# Patient Record
Sex: FEMALE | Race: WHITE | NOT HISPANIC OR LATINO
[De-identification: names, ages, dates, MRNs, and addresses within clinical notes are randomized per-mention and may not be internally consistent; named-entity substitution may affect disease eponyms.]

---

## 2015-07-19 RX ORDER — ASPIRIN/CALCIUM CARB/MAGNESIUM 324 MG
1 TABLET ORAL
Qty: 0 | Refills: 0 | DISCHARGE
Start: 2015-07-19

## 2015-07-19 RX ORDER — ESCITALOPRAM OXALATE 10 MG/1
1 TABLET, FILM COATED ORAL
Qty: 0 | Refills: 0 | DISCHARGE
Start: 2015-07-19

## 2017-02-03 ENCOUNTER — APPOINTMENT (OUTPATIENT)
Dept: SURGERY | Facility: CLINIC | Age: 52
End: 2017-02-03

## 2017-02-03 VITALS — HEIGHT: 63 IN | BODY MASS INDEX: 31.36 KG/M2 | WEIGHT: 177 LBS

## 2017-02-13 ENCOUNTER — FORM ENCOUNTER (OUTPATIENT)
Age: 52
End: 2017-02-13

## 2017-02-14 ENCOUNTER — OUTPATIENT (OUTPATIENT)
Dept: OUTPATIENT SERVICES | Facility: HOSPITAL | Age: 52
LOS: 1 days | End: 2017-02-14
Payer: COMMERCIAL

## 2017-02-14 DIAGNOSIS — Z41.1 ENCOUNTER FOR COSMETIC SURGERY: Chronic | ICD-10-CM

## 2017-02-14 DIAGNOSIS — R59.1 GENERALIZED ENLARGED LYMPH NODES: Chronic | ICD-10-CM

## 2017-02-14 DIAGNOSIS — Z98.89 OTHER SPECIFIED POSTPROCEDURAL STATES: Chronic | ICD-10-CM

## 2017-02-14 DIAGNOSIS — Z90.711 ACQUIRED ABSENCE OF UTERUS WITH REMAINING CERVICAL STUMP: Chronic | ICD-10-CM

## 2017-02-14 DIAGNOSIS — Q17.9 CONGENITAL MALFORMATION OF EAR, UNSPECIFIED: Chronic | ICD-10-CM

## 2017-02-14 DIAGNOSIS — Z90.13 ACQUIRED ABSENCE OF BILATERAL BREASTS AND NIPPLES: Chronic | ICD-10-CM

## 2017-02-14 DIAGNOSIS — K22.2 ESOPHAGEAL OBSTRUCTION: ICD-10-CM

## 2017-02-14 PROCEDURE — 74220 X-RAY XM ESOPHAGUS 1CNTRST: CPT | Mod: 26

## 2017-02-14 PROCEDURE — 74220 X-RAY XM ESOPHAGUS 1CNTRST: CPT

## 2017-03-01 ENCOUNTER — APPOINTMENT (OUTPATIENT)
Dept: THORACIC SURGERY | Facility: CLINIC | Age: 52
End: 2017-03-01

## 2017-03-01 VITALS
OXYGEN SATURATION: 98 % | BODY MASS INDEX: 32.25 KG/M2 | RESPIRATION RATE: 16 BRPM | WEIGHT: 182 LBS | SYSTOLIC BLOOD PRESSURE: 118 MMHG | HEART RATE: 54 BPM | HEIGHT: 63 IN | DIASTOLIC BLOOD PRESSURE: 76 MMHG

## 2017-03-29 RX ORDER — METHOCARBAMOL 500 MG/1
TABLET, FILM COATED ORAL
Refills: 0 | Status: DISCONTINUED | COMMUNITY
End: 2017-03-29

## 2017-05-31 ENCOUNTER — OUTPATIENT (OUTPATIENT)
Dept: OUTPATIENT SERVICES | Facility: HOSPITAL | Age: 52
LOS: 1 days | End: 2017-05-31
Payer: COMMERCIAL

## 2017-05-31 VITALS
WEIGHT: 173.94 LBS | HEIGHT: 63.5 IN | HEART RATE: 55 BPM | RESPIRATION RATE: 14 BRPM | TEMPERATURE: 98 F | OXYGEN SATURATION: 97 % | SYSTOLIC BLOOD PRESSURE: 124 MMHG | DIASTOLIC BLOOD PRESSURE: 84 MMHG

## 2017-05-31 DIAGNOSIS — Z98.89 OTHER SPECIFIED POSTPROCEDURAL STATES: Chronic | ICD-10-CM

## 2017-05-31 DIAGNOSIS — Z01.818 ENCOUNTER FOR OTHER PREPROCEDURAL EXAMINATION: ICD-10-CM

## 2017-05-31 DIAGNOSIS — K21.9 GASTRO-ESOPHAGEAL REFLUX DISEASE WITHOUT ESOPHAGITIS: ICD-10-CM

## 2017-05-31 DIAGNOSIS — Z90.711 ACQUIRED ABSENCE OF UTERUS WITH REMAINING CERVICAL STUMP: Chronic | ICD-10-CM

## 2017-05-31 DIAGNOSIS — G47.33 OBSTRUCTIVE SLEEP APNEA (ADULT) (PEDIATRIC): ICD-10-CM

## 2017-05-31 DIAGNOSIS — Z98.890 OTHER SPECIFIED POSTPROCEDURAL STATES: Chronic | ICD-10-CM

## 2017-05-31 DIAGNOSIS — Z90.13 ACQUIRED ABSENCE OF BILATERAL BREASTS AND NIPPLES: Chronic | ICD-10-CM

## 2017-05-31 DIAGNOSIS — Z41.1 ENCOUNTER FOR COSMETIC SURGERY: Chronic | ICD-10-CM

## 2017-05-31 DIAGNOSIS — R59.1 GENERALIZED ENLARGED LYMPH NODES: Chronic | ICD-10-CM

## 2017-05-31 DIAGNOSIS — M25.472 EFFUSION, LEFT ANKLE: ICD-10-CM

## 2017-05-31 DIAGNOSIS — Q17.9 CONGENITAL MALFORMATION OF EAR, UNSPECIFIED: Chronic | ICD-10-CM

## 2017-05-31 LAB
ANION GAP SERPL CALC-SCNC: 18 MMOL/L — HIGH (ref 5–17)
BUN SERPL-MCNC: 13 MG/DL — SIGNIFICANT CHANGE UP (ref 7–23)
CALCIUM SERPL-MCNC: 10.2 MG/DL — SIGNIFICANT CHANGE UP (ref 8.4–10.5)
CHLORIDE SERPL-SCNC: 101 MMOL/L — SIGNIFICANT CHANGE UP (ref 96–108)
CO2 SERPL-SCNC: 22 MMOL/L — SIGNIFICANT CHANGE UP (ref 22–31)
CREAT SERPL-MCNC: 0.83 MG/DL — SIGNIFICANT CHANGE UP (ref 0.5–1.3)
GLUCOSE SERPL-MCNC: 116 MG/DL — HIGH (ref 70–99)
HCT VFR BLD CALC: 41.3 % — SIGNIFICANT CHANGE UP (ref 34.5–45)
HGB BLD-MCNC: 13.9 G/DL — SIGNIFICANT CHANGE UP (ref 11.5–15.5)
MCHC RBC-ENTMCNC: 30.5 PG — SIGNIFICANT CHANGE UP (ref 27–34)
MCHC RBC-ENTMCNC: 33.7 GM/DL — SIGNIFICANT CHANGE UP (ref 32–36)
MCV RBC AUTO: 90.8 FL — SIGNIFICANT CHANGE UP (ref 80–100)
PLATELET # BLD AUTO: 187 K/UL — SIGNIFICANT CHANGE UP (ref 150–400)
POTASSIUM SERPL-MCNC: 4.2 MMOL/L — SIGNIFICANT CHANGE UP (ref 3.5–5.3)
POTASSIUM SERPL-SCNC: 4.2 MMOL/L — SIGNIFICANT CHANGE UP (ref 3.5–5.3)
RBC # BLD: 4.55 M/UL — SIGNIFICANT CHANGE UP (ref 3.8–5.2)
RBC # FLD: 13.9 % — SIGNIFICANT CHANGE UP (ref 10.3–14.5)
SODIUM SERPL-SCNC: 141 MMOL/L — SIGNIFICANT CHANGE UP (ref 135–145)
WBC # BLD: 8.48 K/UL — SIGNIFICANT CHANGE UP (ref 3.8–10.5)
WBC # FLD AUTO: 8.48 K/UL — SIGNIFICANT CHANGE UP (ref 3.8–10.5)

## 2017-05-31 PROCEDURE — 85027 COMPLETE CBC AUTOMATED: CPT

## 2017-05-31 PROCEDURE — G0463: CPT

## 2017-05-31 PROCEDURE — 80048 BASIC METABOLIC PNL TOTAL CA: CPT

## 2017-05-31 NOTE — H&P PST ADULT - PMH
Adjustment disorder with depressed mood    Aortic aneurysm  diagnosed 2013  Bradycardia  normally in the 40's  Breast CA    Contact dermatitis  chronic hands  Decreased hearing, left  uses hearing aid 80 % loss  Factor V Leiden carrier  one copy of factor V deficiency carrier  on hematology follow up every 6 months  HTN (hypertension)    Incisional hernia    Morbid obesity  on c-pap  gastric vertical sleeve done 07/14.  wt loss 100 lbs  MSSA (methicillin susceptible Staphylococcus aureus) infection  4/15 surgical inicsion , s/p hernia repair  FACUNDO (obstructive sleep apnea)    PE (pulmonary embolism)  09/2013  managed with Xarolta, now on asprin 81  IVC green field filter placed 07/2014  Pulmonary embolism  9/2013

## 2017-05-31 NOTE — H&P PST ADULT - ACTIVITY
Ice hockey 2x/week, Walking: 3 times a week for 1/2 hour, Kayaking in the summer 2 times a week; Ice Hockey in the winter twice a week

## 2017-05-31 NOTE — H&P PST ADULT - HISTORY OF PRESENT ILLNESS
49 year old female reports a known history of aortic aneurysm since 2013. Recent imaging revealed aneurysm had increased in size, presents for Ascending Aortic Replacement. 50 yo female s/p gastric sleeve, S/P AAA, now for hernia.

## 2017-05-31 NOTE — H&P PST ADULT - FAMILY HISTORY
Mother  Still living? Unknown  Family history of breast cancer, Age at diagnosis: Age Unknown     Sibling  Still living? Unknown  Family history of breast cancer, Age at diagnosis: Age Unknown     Aunt  Still living? Unknown  Family history of breast cancer, Age at diagnosis: Age Unknown

## 2017-05-31 NOTE — H&P PST ADULT - PSH
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  H/O ventral hernia repair  4/15  LN (lymphadenopathy)  2 AXILLARY ln REMOVED Right   -BENIGN 2007  Presence of IVC filter  VIA RIGHT GROIN, 2014  with GASTRIC SLEEVE  S/P AAA (abdominal aortic aneurysm) repair  2015  S/P biopsy  1991, right breast, 2007 axillary right arm pit (benign)  S/P biopsy  2011 left breast (negative)  S/P foot surgery  2013 rigth foot, fracture, hammer toe and bunionectomy  S/P mastectomy, bilateral  LCIS,  DOUBLE MASTECTOMY   with DEIP procedure 2012 (right breast CA)  S/p partial hysterectomy with remaining cervical stump  for fibroids 2008  S/P rhinoplasty  1982  S/P tonsillectomy  1979

## 2017-06-07 ENCOUNTER — APPOINTMENT (OUTPATIENT)
Dept: CARDIOLOGY | Facility: CLINIC | Age: 52
End: 2017-06-07

## 2017-06-08 ENCOUNTER — APPOINTMENT (OUTPATIENT)
Dept: CARDIOLOGY | Facility: CLINIC | Age: 52
End: 2017-06-08

## 2017-06-14 ENCOUNTER — INPATIENT (INPATIENT)
Facility: HOSPITAL | Age: 52
LOS: 0 days | Discharge: HOME | DRG: 327 | End: 2017-06-15
Attending: SURGERY | Admitting: SURGERY
Payer: COMMERCIAL

## 2017-06-14 ENCOUNTER — APPOINTMENT (OUTPATIENT)
Dept: SURGERY | Facility: HOSPITAL | Age: 52
End: 2017-06-14

## 2017-06-14 ENCOUNTER — TRANSCRIPTION ENCOUNTER (OUTPATIENT)
Age: 52
End: 2017-06-14

## 2017-06-14 VITALS
HEIGHT: 63 IN | SYSTOLIC BLOOD PRESSURE: 141 MMHG | HEART RATE: 48 BPM | TEMPERATURE: 99 F | DIASTOLIC BLOOD PRESSURE: 88 MMHG | OXYGEN SATURATION: 98 % | RESPIRATION RATE: 18 BRPM | WEIGHT: 169.09 LBS

## 2017-06-14 DIAGNOSIS — Z98.89 OTHER SPECIFIED POSTPROCEDURAL STATES: Chronic | ICD-10-CM

## 2017-06-14 DIAGNOSIS — Z01.818 ENCOUNTER FOR OTHER PREPROCEDURAL EXAMINATION: ICD-10-CM

## 2017-06-14 DIAGNOSIS — Z90.13 ACQUIRED ABSENCE OF BILATERAL BREASTS AND NIPPLES: Chronic | ICD-10-CM

## 2017-06-14 DIAGNOSIS — R59.1 GENERALIZED ENLARGED LYMPH NODES: Chronic | ICD-10-CM

## 2017-06-14 DIAGNOSIS — K21.9 GASTRO-ESOPHAGEAL REFLUX DISEASE WITHOUT ESOPHAGITIS: ICD-10-CM

## 2017-06-14 DIAGNOSIS — Z90.711 ACQUIRED ABSENCE OF UTERUS WITH REMAINING CERVICAL STUMP: Chronic | ICD-10-CM

## 2017-06-14 DIAGNOSIS — Q17.9 CONGENITAL MALFORMATION OF EAR, UNSPECIFIED: Chronic | ICD-10-CM

## 2017-06-14 DIAGNOSIS — Z41.1 ENCOUNTER FOR COSMETIC SURGERY: Chronic | ICD-10-CM

## 2017-06-14 DIAGNOSIS — Z98.890 OTHER SPECIFIED POSTPROCEDURAL STATES: Chronic | ICD-10-CM

## 2017-06-14 RX ORDER — CEFAZOLIN SODIUM 1 G
2000 VIAL (EA) INJECTION ONCE
Qty: 0 | Refills: 0 | Status: DISCONTINUED | OUTPATIENT
Start: 2017-06-14 | End: 2017-06-14

## 2017-06-14 RX ORDER — ACETAMINOPHEN 500 MG
1000 TABLET ORAL ONCE
Qty: 0 | Refills: 0 | Status: COMPLETED | OUTPATIENT
Start: 2017-06-14 | End: 2017-06-14

## 2017-06-14 RX ORDER — DEXTROSE MONOHYDRATE, SODIUM CHLORIDE, AND POTASSIUM CHLORIDE 50; .745; 4.5 G/1000ML; G/1000ML; G/1000ML
1000 INJECTION, SOLUTION INTRAVENOUS
Qty: 0 | Refills: 0 | Status: DISCONTINUED | OUTPATIENT
Start: 2017-06-14 | End: 2017-06-15

## 2017-06-14 RX ORDER — HYDROMORPHONE HYDROCHLORIDE 2 MG/ML
0.5 INJECTION INTRAMUSCULAR; INTRAVENOUS; SUBCUTANEOUS
Qty: 0 | Refills: 0 | Status: DISCONTINUED | OUTPATIENT
Start: 2017-06-14 | End: 2017-06-14

## 2017-06-14 RX ORDER — PANTOPRAZOLE SODIUM 20 MG/1
40 TABLET, DELAYED RELEASE ORAL
Qty: 0 | Refills: 0 | Status: DISCONTINUED | OUTPATIENT
Start: 2017-06-14 | End: 2017-06-15

## 2017-06-14 RX ORDER — ONDANSETRON 8 MG/1
4 TABLET, FILM COATED ORAL ONCE
Qty: 0 | Refills: 0 | Status: DISCONTINUED | OUTPATIENT
Start: 2017-06-14 | End: 2017-06-14

## 2017-06-14 RX ORDER — IBUPROFEN 200 MG
600 TABLET ORAL EVERY 6 HOURS
Qty: 0 | Refills: 0 | Status: DISCONTINUED | OUTPATIENT
Start: 2017-06-14 | End: 2017-06-15

## 2017-06-14 RX ORDER — ESCITALOPRAM OXALATE 10 MG/1
10 TABLET, FILM COATED ORAL DAILY
Qty: 0 | Refills: 0 | Status: DISCONTINUED | OUTPATIENT
Start: 2017-06-14 | End: 2017-06-15

## 2017-06-14 RX ORDER — HEPARIN SODIUM 5000 [USP'U]/ML
5000 INJECTION INTRAVENOUS; SUBCUTANEOUS EVERY 8 HOURS
Qty: 0 | Refills: 0 | Status: DISCONTINUED | OUTPATIENT
Start: 2017-06-14 | End: 2017-06-15

## 2017-06-14 RX ORDER — ACETAMINOPHEN 500 MG
650 TABLET ORAL EVERY 6 HOURS
Qty: 0 | Refills: 0 | Status: DISCONTINUED | OUTPATIENT
Start: 2017-06-14 | End: 2017-06-15

## 2017-06-14 RX ORDER — HYDRALAZINE HCL 50 MG
10 TABLET ORAL ONCE
Qty: 0 | Refills: 0 | Status: COMPLETED | OUTPATIENT
Start: 2017-06-14 | End: 2017-06-14

## 2017-06-14 RX ORDER — SODIUM CHLORIDE 9 MG/ML
3 INJECTION INTRAMUSCULAR; INTRAVENOUS; SUBCUTANEOUS EVERY 8 HOURS
Qty: 0 | Refills: 0 | Status: DISCONTINUED | OUTPATIENT
Start: 2017-06-14 | End: 2017-06-14

## 2017-06-14 RX ADMIN — Medication 650 MILLIGRAM(S): at 16:18

## 2017-06-14 RX ADMIN — HEPARIN SODIUM 5000 UNIT(S): 5000 INJECTION INTRAVENOUS; SUBCUTANEOUS at 14:25

## 2017-06-14 RX ADMIN — Medication 650 MILLIGRAM(S): at 16:48

## 2017-06-14 RX ADMIN — Medication 400 MILLIGRAM(S): at 22:50

## 2017-06-14 RX ADMIN — HEPARIN SODIUM 5000 UNIT(S): 5000 INJECTION INTRAVENOUS; SUBCUTANEOUS at 21:25

## 2017-06-14 RX ADMIN — Medication 1000 MILLIGRAM(S): at 23:20

## 2017-06-14 RX ADMIN — Medication 10 MILLIGRAM(S): at 18:31

## 2017-06-14 NOTE — PATIENT PROFILE ADULT. - PMH
AAA (abdominal aortic aneurysm)  4.8 cm (as per pt.), being f/u by  Dr William Jorgensen  on standby if any troble during surgery, (last visit 04/01/15) &advised/ planning to do surgery 07/2015) cardiologist Dr. Arzate  Bradycardia  normally in the 40's  Breast CA    Decreased hearing, left  uses hearing aid 80 % loss  Factor V Leiden carrier  one copy of factor V deficiency carrier  on hematology follow up every 6 months  HTN (hypertension)    Incisional hernia    Morbid obesity  on c-pap  gastric vertical sleeve done 07/14.  FACUNDO (obstructive sleep apnea)    PE (pulmonary embolism)  09/2013  managed with Xarolta, now on asprin 81  IVC green field filter placed 07/2014  Pulmonary embolism  9/2013

## 2017-06-14 NOTE — PATIENT PROFILE ADULT. - FAMILY HISTORY
Sibling  Still living? Unknown  Family history of breast cancer, Age at diagnosis: Age Unknown     Aunt  Still living? Unknown  Family history of breast cancer, Age at diagnosis: Age Unknown

## 2017-06-14 NOTE — PROVIDER CONTACT NOTE (OTHER) - ASSESSMENT
pt stated " My neck is swollen probably from the tube that was in my throat during surgery and I can't swallow that good" RN palpated neck and it felt swollen. pt with pain as well but cannot swallow pain meds and cannot put on bipap machine too

## 2017-06-14 NOTE — PROVIDER CONTACT NOTE (OTHER) - ACTION/TREATMENT ORDERED:
MD assessed pt and stated to put nasal cannula 2 liters on tonight to sleep and MD ordered iv Tylenol for the pain. will monitor.

## 2017-06-14 NOTE — PATIENT PROFILE ADULT. - ABILITY TO HEAR (WITH HEARING AID OR HEARING APPLIANCE IF NORMALLY USED):
hearing aides at home/Mildly to Moderately Impaired: difficulty hearing in some environments or speaker may need to increase volume or speak distinctly

## 2017-06-14 NOTE — PATIENT PROFILE ADULT. - PSH
Ear anomaly  s/p mastoidectomy, left ear 1982, prosthesis on left ear 1982 - did not work - uses hearing aid  LN (lymphadenopathy)  2 AXILLARY ln REMOVED Right   -BENIGN 2007  Presence of IVC filter  VIA RIGHT GROIN, 2014  with GASTRIC SLEEVE  S/P biopsy  1991, right breast, 2007 axillary right arm pit (benign)  S/P biopsy  2011 left breast (negative)  S/P foot surgery  2013 rigth foot, fracture, hammer toe and bunionectomy  S/P mastectomy, bilateral  LCIS,  DOUBLE MASTECTOMY   with DEIP procedure 2012 (right breast CA)  S/p partial hysterectomy with remaining cervical stump  for fibroids 2008  S/P rhinoplasty  1982  S/P tonsillectomy  1979

## 2017-06-14 NOTE — PROVIDER CONTACT NOTE (OTHER) - ACTION/TREATMENT ORDERED:
provider aware, states she will consult with resident. Hydralazine 10mg ordered with BP after of 159/88 HR 79, will continue to monitor

## 2017-06-15 VITALS
RESPIRATION RATE: 18 BRPM | DIASTOLIC BLOOD PRESSURE: 85 MMHG | HEART RATE: 65 BPM | SYSTOLIC BLOOD PRESSURE: 162 MMHG | OXYGEN SATURATION: 96 % | TEMPERATURE: 98 F

## 2017-06-15 LAB
ANION GAP SERPL CALC-SCNC: 15 MMOL/L — SIGNIFICANT CHANGE UP (ref 5–17)
BUN SERPL-MCNC: 9 MG/DL — SIGNIFICANT CHANGE UP (ref 7–23)
CALCIUM SERPL-MCNC: 9 MG/DL — SIGNIFICANT CHANGE UP (ref 8.4–10.5)
CHLORIDE SERPL-SCNC: 102 MMOL/L — SIGNIFICANT CHANGE UP (ref 96–108)
CO2 SERPL-SCNC: 22 MMOL/L — SIGNIFICANT CHANGE UP (ref 22–31)
CREAT SERPL-MCNC: 0.6 MG/DL — SIGNIFICANT CHANGE UP (ref 0.5–1.3)
GLUCOSE SERPL-MCNC: 102 MG/DL — HIGH (ref 70–99)
HCT VFR BLD CALC: 39 % — SIGNIFICANT CHANGE UP (ref 34.5–45)
HGB BLD-MCNC: 12.9 G/DL — SIGNIFICANT CHANGE UP (ref 11.5–15.5)
MAGNESIUM SERPL-MCNC: 2.1 MG/DL — SIGNIFICANT CHANGE UP (ref 1.6–2.6)
MCHC RBC-ENTMCNC: 30.1 PG — SIGNIFICANT CHANGE UP (ref 27–34)
MCHC RBC-ENTMCNC: 33.1 GM/DL — SIGNIFICANT CHANGE UP (ref 32–36)
MCV RBC AUTO: 90.9 FL — SIGNIFICANT CHANGE UP (ref 80–100)
PHOSPHATE SERPL-MCNC: 3.3 MG/DL — SIGNIFICANT CHANGE UP (ref 2.5–4.5)
PLATELET # BLD AUTO: 185 K/UL — SIGNIFICANT CHANGE UP (ref 150–400)
POTASSIUM SERPL-MCNC: 3.6 MMOL/L — SIGNIFICANT CHANGE UP (ref 3.5–5.3)
POTASSIUM SERPL-SCNC: 3.6 MMOL/L — SIGNIFICANT CHANGE UP (ref 3.5–5.3)
RBC # BLD: 4.29 M/UL — SIGNIFICANT CHANGE UP (ref 3.8–5.2)
RBC # FLD: 14.3 % — SIGNIFICANT CHANGE UP (ref 10.3–14.5)
SODIUM SERPL-SCNC: 139 MMOL/L — SIGNIFICANT CHANGE UP (ref 135–145)
WBC # BLD: 9.58 K/UL — SIGNIFICANT CHANGE UP (ref 3.8–10.5)
WBC # FLD AUTO: 9.58 K/UL — SIGNIFICANT CHANGE UP (ref 3.8–10.5)

## 2017-06-15 PROCEDURE — 84100 ASSAY OF PHOSPHORUS: CPT

## 2017-06-15 PROCEDURE — 80048 BASIC METABOLIC PNL TOTAL CA: CPT

## 2017-06-15 PROCEDURE — 83735 ASSAY OF MAGNESIUM: CPT

## 2017-06-15 PROCEDURE — C1889: CPT

## 2017-06-15 PROCEDURE — 85027 COMPLETE CBC AUTOMATED: CPT

## 2017-06-15 RX ORDER — IBUPROFEN 200 MG
1 TABLET ORAL
Qty: 0 | Refills: 0 | DISCHARGE
Start: 2017-06-15

## 2017-06-15 RX ORDER — ACETAMINOPHEN 500 MG
2 TABLET ORAL
Qty: 0 | Refills: 0 | DISCHARGE
Start: 2017-06-15

## 2017-06-15 RX ADMIN — DEXTROSE MONOHYDRATE, SODIUM CHLORIDE, AND POTASSIUM CHLORIDE 50 MILLILITER(S): 50; .745; 4.5 INJECTION, SOLUTION INTRAVENOUS at 05:30

## 2017-06-15 RX ADMIN — Medication 650 MILLIGRAM(S): at 11:27

## 2017-06-15 RX ADMIN — HEPARIN SODIUM 5000 UNIT(S): 5000 INJECTION INTRAVENOUS; SUBCUTANEOUS at 05:32

## 2017-06-15 RX ADMIN — Medication 650 MILLIGRAM(S): at 11:57

## 2017-06-15 RX ADMIN — ESCITALOPRAM OXALATE 10 MILLIGRAM(S): 10 TABLET, FILM COATED ORAL at 11:27

## 2017-06-15 RX ADMIN — Medication 650 MILLIGRAM(S): at 05:31

## 2017-06-15 RX ADMIN — Medication 650 MILLIGRAM(S): at 06:00

## 2017-06-15 RX ADMIN — PANTOPRAZOLE SODIUM 40 MILLIGRAM(S): 20 TABLET, DELAYED RELEASE ORAL at 05:32

## 2017-06-15 RX ADMIN — HEPARIN SODIUM 5000 UNIT(S): 5000 INJECTION INTRAVENOUS; SUBCUTANEOUS at 13:36

## 2017-06-15 NOTE — DISCHARGE NOTE ADULT - PLAN OF CARE
Postoperative Recovery FOLLOW-UP:  1. Please call to make a follow-up appointment within one week of discharge with Dr. Douglas (See below for office information to call for an appointment)   2. Please follow up with your primary care physician in one week regarding your hospitalization.  ACTIVITY: No heavy lifting or straining. Otherwise, you may return to your usual level of physical activity. If you are taking narcotic pain medication (such as Percocet), do NOT drive a car, operate machinery or make important decisions.  DIET: Please continue a soft diet until instructed by Dr. Douglas to change your diet.  WOUND CARE: Do not remove steri strips. They will fall off on their own.  After showering, please pat steri strips dry. After surgery, some blood may escape from under the tape. The paper tape may fall off after several days. If not, they will be removed in the office.  BATHING: Please do not submerge wound underwater. You may shower and/or sponge bathe.  NOTIFY YOUR SURGEON IF: You have any bleeding that does not stop, any pus draining from your wound, any fever (over 100.4 F) or chills, persistent nausea/vomiting, persistent diarrhea, or if your pain is not controlled on your discharge pain medications.

## 2017-06-15 NOTE — DISCHARGE NOTE ADULT - CARE PROVIDER_API CALL
Jose Douglas (MD), Surgery  [address] [place] [address]  Phone: [phone]  Fax: [phone]

## 2017-06-15 NOTE — PROGRESS NOTE ADULT - ATTENDING COMMENTS
Pt seen and examined  Agree with note which was reviewed and edited where appropriate.  D/W patient, RN, residents and Fellow

## 2017-06-15 NOTE — DISCHARGE NOTE ADULT - PATIENT PORTAL LINK FT
“You can access the Follow[facility] Patient Portal, offered by [facility], by registering with the following website: [email or web address]”

## 2017-06-15 NOTE — DISCHARGE NOTE ADULT - ADDITIONAL INSTRUCTIONS
Please follow up with Dr. Douglas within 1-2 weeks after discharge from the hospital. You may call [phone] to schedule an appointment.

## 2017-06-15 NOTE — DISCHARGE NOTE ADULT - HOSPITAL COURSE
51yF was admitted to [facility] on 6/14. The patient had a hiatal hernia. She underwent a laparoscopic repair of hiatal hernia w/ gastropexy. Post operative the patient was sent to the PACU, the patient was hemodynamically stable and sent to a surgical floor. She was pain was controlled by IV pain medications transitioned to po narcotics. The patient was started on a CLD, advanced to a soft diet, and tolerated it well. The patient was hemodynamically stable and placed on home medications. She was told to follow up with Dr. Douglas in 1-2 weeks and had no other issues. Patient remained hemodynamically stable and was cleared per attending for discharge; tolerating soft diet, voiding appropriately, ambulating, and with pain well controlled on oral analgesics.

## 2017-06-15 NOTE — DISCHARGE NOTE ADULT - MEDICATION SUMMARY - MEDICATIONS TO TAKE
I will START or STAY ON the medications listed below when I get home from the hospital:    acetaminophen 325 mg oral tablet  -- 2 tab(s) by mouth every 6 hours, As needed, Moderate Pain (4 - 6)  -- Indication: For Pain    ibuprofen 600 mg oral tablet  -- 1 tab(s) by mouth every 6 hours, As needed, Severe Pain  -- Indication: For Pain    aspirin 81 mg oral tablet  -- 1 tab(s) by mouth once a day  -- Indication: For Factor V Liden    escitalopram 10 mg oral tablet  -- 1 tab(s) by mouth once a day  -- Indication: For Depression    Flonase Sensimist 27.5 mcg/inh nasal spray  -- 2 spray(s) into nose once a day  -- Indication: For Allergic Rhinitis    pantoprazole 20 mg oral delayed release tablet  -- 1 tab(s) by mouth once a day  -- Indication: For GERD    Multi-Day Plus Minerals oral tablet  -- 1 tab(s) by mouth once a day  -- Indication: For Nutrition

## 2017-06-15 NOTE — PROGRESS NOTE ADULT - SUBJECTIVE AND OBJECTIVE BOX
GENERAL SURGERY DAILY PROGRESS NOTE:     Hospital Day: 2    Postoperative Day: 1    Status post: Lap hiatal hernia repair    Subjective:  Pain well controlled. Passing flatus Denies BM. Tolerating CLD. Denies N/V. No CP, SoB, palpitations.          Objective:    PE:  General: NAD  Resp: airway patent, respirations unlabored, no increased WoB  CVS: RRR  Abdomen: soft, ND, NT, incisions c/d/i  Extremities: no edema  Neuro: AAOx3, no focal deficits    Vital Signs Last 24 Hrs  T(C): 36.6, Max: 37 (06-14 @ 06:22)  T(F): 97.8, Max: 98.6 (06-14 @ 06:22)  HR: 58 (48 - 80)  BP: 160/87 (117/65 - 173/97)  BP(mean): 110 (86 - 119)  RR: 18 (13 - 18)  SpO2: 96% (93% - 100%)    I&O's Detail    I & Os for current day (as of 15 Nickolas 2017 05:26)  =============================================  IN:    dextrose 5% + sodium chloride 0.45% with potassium chloride 20 mEq/L: 220 ml    Oral Fluid: 120 ml    IV PiggyBack: 100 ml    Total IN: 440 ml  ---------------------------------------------  OUT:    Voided: 2450 ml    Indwelling Catheter - Urethral: 200 ml    Total OUT: 2650 ml  ---------------------------------------------  Total NET: -2210 ml      Daily Height in cm: 160.02 (14 Jun 2017 06:22)    Daily     MEDICATIONS  (STANDING):  heparin  Injectable 5000Unit(s) SubCutaneous every 8 hours  pantoprazole    Tablet 40milliGRAM(s) Oral before breakfast  escitalopram 10milliGRAM(s) Oral daily  dextrose 5% + sodium chloride 0.45% with potassium chloride 20 mEq/L 1000milliLiter(s) IV Continuous <Continuous>    MEDICATIONS  (PRN):  acetaminophen   Tablet. 650milliGRAM(s) Oral every 6 hours PRN Moderate Pain (4 - 6)  ibuprofen  Tablet 600milliGRAM(s) Oral every 6 hours PRN Severe Pain      LABS:                RADIOLOGY & ADDITIONAL STUDIES:

## 2017-06-15 NOTE — DISCHARGE NOTE ADULT - CARE PLAN
Principal Discharge DX:	Hiatal hernia  Goal:	Postoperative Recovery  Instructions for follow-up, activity and diet:	FOLLOW-UP:  1. Please call to make a follow-up appointment within one week of discharge with Dr. Douglas (See below for office information to call for an appointment)   2. Please follow up with your primary care physician in one week regarding your hospitalization.  ACTIVITY: No heavy lifting or straining. Otherwise, you may return to your usual level of physical activity. If you are taking narcotic pain medication (such as Percocet), do NOT drive a car, operate machinery or make important decisions.  DIET: Please continue a soft diet until instructed by Dr. Douglas to change your diet.  WOUND CARE: Do not remove steri strips. They will fall off on their own.  After showering, please pat steri strips dry. After surgery, some blood may escape from under the tape. The paper tape may fall off after several days. If not, they will be removed in the office.  BATHING: Please do not submerge wound underwater. You may shower and/or sponge bathe.  NOTIFY YOUR SURGEON IF: You have any bleeding that does not stop, any pus draining from your wound, any fever (over 100.4 F) or chills, persistent nausea/vomiting, persistent diarrhea, or if your pain is not controlled on your discharge pain medications.

## 2017-06-16 ENCOUNTER — TRANSCRIPTION ENCOUNTER (OUTPATIENT)
Age: 52
End: 2017-06-16

## 2017-06-23 ENCOUNTER — APPOINTMENT (OUTPATIENT)
Dept: SURGERY | Facility: CLINIC | Age: 52
End: 2017-06-23

## 2017-06-23 RX ORDER — SULFAMETHOXAZOLE AND TRIMETHOPRIM 800; 160 MG/1; MG/1
800-160 TABLET ORAL
Qty: 14 | Refills: 0 | Status: DISCONTINUED | COMMUNITY
Start: 2017-02-28

## 2017-06-23 RX ORDER — CLOPIDOGREL BISULFATE 75 MG/1
75 TABLET, FILM COATED ORAL
Qty: 30 | Refills: 0 | Status: DISCONTINUED | COMMUNITY
Start: 2017-02-20

## 2017-06-23 RX ORDER — KETOCONAZOLE 20 MG/G
2 CREAM TOPICAL
Qty: 60 | Refills: 0 | Status: DISCONTINUED | COMMUNITY
Start: 2017-05-11

## 2017-06-23 RX ORDER — METHOCARBAMOL 750 MG/1
750 TABLET, FILM COATED ORAL
Qty: 60 | Refills: 0 | Status: DISCONTINUED | COMMUNITY
Start: 2017-01-25

## 2017-06-23 RX ORDER — MUPIROCIN 2 G/100G
2 CREAM TOPICAL
Qty: 60 | Refills: 0 | Status: DISCONTINUED | COMMUNITY
Start: 2017-02-27

## 2017-06-23 RX ORDER — TRIAMCINOLONE ACETONIDE 1 MG/G
0.1 CREAM TOPICAL
Qty: 454 | Refills: 0 | Status: DISCONTINUED | COMMUNITY
Start: 2017-05-11

## 2018-03-19 ENCOUNTER — RESULT CHARGE (OUTPATIENT)
Age: 53
End: 2018-03-19

## 2018-03-20 ENCOUNTER — NON-APPOINTMENT (OUTPATIENT)
Age: 53
End: 2018-03-20

## 2018-03-20 ENCOUNTER — APPOINTMENT (OUTPATIENT)
Dept: CARDIOLOGY | Facility: CLINIC | Age: 53
End: 2018-03-20
Payer: COMMERCIAL

## 2018-03-20 VITALS
HEIGHT: 63 IN | BODY MASS INDEX: 31.89 KG/M2 | DIASTOLIC BLOOD PRESSURE: 80 MMHG | SYSTOLIC BLOOD PRESSURE: 120 MMHG | HEART RATE: 52 BPM | WEIGHT: 180 LBS

## 2018-03-20 DIAGNOSIS — R09.89 OTHER SPECIFIED SYMPTOMS AND SIGNS INVOLVING THE CIRCULATORY AND RESPIRATORY SYSTEMS: ICD-10-CM

## 2018-03-20 DIAGNOSIS — K21.9 GASTRO-ESOPHAGEAL REFLUX DISEASE W/OUT ESOPHAGITIS: ICD-10-CM

## 2018-03-20 PROCEDURE — 99214 OFFICE O/P EST MOD 30 MIN: CPT

## 2018-03-20 PROCEDURE — 93000 ELECTROCARDIOGRAM COMPLETE: CPT

## 2018-03-28 ENCOUNTER — OTHER (OUTPATIENT)
Age: 53
End: 2018-03-28

## 2018-04-04 ENCOUNTER — APPOINTMENT (OUTPATIENT)
Dept: CARDIOLOGY | Facility: CLINIC | Age: 53
End: 2018-04-04
Payer: COMMERCIAL

## 2018-04-04 PROCEDURE — 93880 EXTRACRANIAL BILAT STUDY: CPT

## 2018-04-04 PROCEDURE — 93351 STRESS TTE COMPLETE: CPT

## 2018-04-16 ENCOUNTER — APPOINTMENT (OUTPATIENT)
Dept: SURGERY | Facility: CLINIC | Age: 53
End: 2018-04-16

## 2018-05-11 ENCOUNTER — APPOINTMENT (OUTPATIENT)
Dept: CARDIOLOGY | Facility: CLINIC | Age: 53
End: 2018-05-11
Payer: COMMERCIAL

## 2018-05-11 VITALS
WEIGHT: 177 LBS | BODY MASS INDEX: 31.36 KG/M2 | SYSTOLIC BLOOD PRESSURE: 116 MMHG | DIASTOLIC BLOOD PRESSURE: 70 MMHG | HEART RATE: 64 BPM | HEIGHT: 63 IN

## 2018-05-11 PROCEDURE — 99214 OFFICE O/P EST MOD 30 MIN: CPT

## 2018-06-19 ENCOUNTER — APPOINTMENT (OUTPATIENT)
Dept: VASCULAR SURGERY | Facility: CLINIC | Age: 53
End: 2018-06-19
Payer: COMMERCIAL

## 2018-06-19 VITALS — HEART RATE: 47 BPM | SYSTOLIC BLOOD PRESSURE: 147 MMHG | DIASTOLIC BLOOD PRESSURE: 91 MMHG

## 2018-06-19 VITALS — HEIGHT: 63 IN | WEIGHT: 178 LBS | BODY MASS INDEX: 31.54 KG/M2

## 2018-06-19 PROCEDURE — 99204 OFFICE O/P NEW MOD 45 MIN: CPT

## 2019-07-26 ENCOUNTER — APPOINTMENT (OUTPATIENT)
Dept: RHEUMATOLOGY | Facility: CLINIC | Age: 54
End: 2019-07-26
Payer: COMMERCIAL

## 2019-07-26 VITALS
HEIGHT: 63 IN | HEART RATE: 58 BPM | OXYGEN SATURATION: 98 % | DIASTOLIC BLOOD PRESSURE: 83 MMHG | WEIGHT: 176 LBS | BODY MASS INDEX: 31.18 KG/M2 | RESPIRATION RATE: 16 BRPM | SYSTOLIC BLOOD PRESSURE: 147 MMHG

## 2019-07-26 PROCEDURE — 99204 OFFICE O/P NEW MOD 45 MIN: CPT | Mod: 25

## 2019-07-26 PROCEDURE — 36415 COLL VENOUS BLD VENIPUNCTURE: CPT

## 2019-08-07 ENCOUNTER — APPOINTMENT (OUTPATIENT)
Dept: MRI IMAGING | Facility: CLINIC | Age: 54
End: 2019-08-07

## 2019-08-07 ENCOUNTER — APPOINTMENT (OUTPATIENT)
Dept: RADIOLOGY | Facility: CLINIC | Age: 54
End: 2019-08-07
Payer: COMMERCIAL

## 2019-08-07 PROCEDURE — 77080 DXA BONE DENSITY AXIAL: CPT

## 2019-08-08 PROCEDURE — 73221 MRI JOINT UPR EXTREM W/O DYE: CPT | Mod: LT

## 2019-08-27 ENCOUNTER — APPOINTMENT (OUTPATIENT)
Dept: RHEUMATOLOGY | Facility: CLINIC | Age: 54
End: 2019-08-27
Payer: COMMERCIAL

## 2019-08-27 VITALS
HEIGHT: 63 IN | OXYGEN SATURATION: 97 % | BODY MASS INDEX: 30.65 KG/M2 | HEART RATE: 57 BPM | TEMPERATURE: 98.6 F | DIASTOLIC BLOOD PRESSURE: 94 MMHG | SYSTOLIC BLOOD PRESSURE: 163 MMHG | WEIGHT: 173 LBS

## 2019-08-27 DIAGNOSIS — M94.262 CHONDROMALACIA, LEFT KNEE: ICD-10-CM

## 2019-08-27 DIAGNOSIS — M65.331 TRIGGER FINGER, RIGHT MIDDLE FINGER: ICD-10-CM

## 2019-08-27 DIAGNOSIS — M19.041 PRIMARY OSTEOARTHRITIS, RIGHT HAND: ICD-10-CM

## 2019-08-27 DIAGNOSIS — M17.0 BILATERAL PRIMARY OSTEOARTHRITIS OF KNEE: ICD-10-CM

## 2019-08-27 DIAGNOSIS — M15.0 PRIMARY GENERALIZED (OSTEO)ARTHRITIS: ICD-10-CM

## 2019-08-27 DIAGNOSIS — M65.311 TRIGGER THUMB, RIGHT THUMB: ICD-10-CM

## 2019-08-27 DIAGNOSIS — M19.011 PRIMARY OSTEOARTHRITIS, RIGHT SHOULDER: ICD-10-CM

## 2019-08-27 DIAGNOSIS — M18.0 BILATERAL PRIMARY OSTEOARTHRITIS OF FIRST CARPOMETACARPAL JOINTS: ICD-10-CM

## 2019-08-27 DIAGNOSIS — M25.50 PAIN IN UNSPECIFIED JOINT: ICD-10-CM

## 2019-08-27 DIAGNOSIS — M19.012 PRIMARY OSTEOARTHRITIS, RIGHT SHOULDER: ICD-10-CM

## 2019-08-27 DIAGNOSIS — M67.912 UNSPECIFIED DISORDER OF SYNOVIUM AND TENDON, LEFT SHOULDER: ICD-10-CM

## 2019-08-27 DIAGNOSIS — M19.042 PRIMARY OSTEOARTHRITIS, RIGHT HAND: ICD-10-CM

## 2019-08-27 PROCEDURE — 99214 OFFICE O/P EST MOD 30 MIN: CPT

## 2019-08-29 PROBLEM — M65.331 ACQUIRED TRIGGER FINGER OF RIGHT MIDDLE FINGER: Status: ACTIVE | Noted: 2019-07-30

## 2019-08-29 PROBLEM — M17.0 PRIMARY OSTEOARTHRITIS OF BOTH KNEES: Status: ACTIVE | Noted: 2019-07-30

## 2019-08-29 PROBLEM — M94.262 CHONDROMALACIA OF LEFT KNEE: Status: ACTIVE | Noted: 2019-07-30

## 2019-08-29 PROBLEM — M18.0 PRIMARY OSTEOARTHRITIS OF BOTH FIRST CARPOMETACARPAL JOINTS: Status: ACTIVE | Noted: 2019-07-30

## 2019-08-29 PROBLEM — M67.912 TENDINOPATHY OF LEFT ROTATOR CUFF: Status: ACTIVE | Noted: 2019-07-30

## 2019-08-29 PROBLEM — M65.311 TRIGGER FINGER OF RIGHT THUMB: Status: ACTIVE | Noted: 2019-07-30

## 2019-08-29 PROBLEM — M15.0 PRIMARY OSTEOARTHRITIS INVOLVING MULTIPLE JOINTS: Status: ACTIVE | Noted: 2019-07-26

## 2019-08-29 PROBLEM — M19.011 PRIMARY OSTEOARTHRITIS OF BOTH SHOULDERS: Status: ACTIVE | Noted: 2019-07-30

## 2019-08-29 PROBLEM — M25.50 ARTHRALGIA OF MULTIPLE JOINTS: Status: ACTIVE | Noted: 2019-07-26

## 2019-08-29 PROBLEM — M19.041 PRIMARY OSTEOARTHRITIS OF BOTH HANDS: Status: ACTIVE | Noted: 2019-07-30

## 2019-08-29 NOTE — ASSESSMENT
[FreeTextEntry1] : 53y F with polyarthralgia affecting multiple joints due to degenerative joint disease / osteoarthritis including her bilateral hands, knees, AC/GH. She has RTC tendinopathy of the left shoulder as well.  She has triggering of R 1st and 3rd digits likely due to overuse injury and osteoarthritis. She has no evidence of inflammatory polyarthritis.  I would check labs and serologies, as well as DEXA to evaluate h/o breast cancer - pleomorphic LCIS. Labs negative for systemic inflammatory autoimmune rheumatic disease or inflammatory arthritis.\par \par DEXA of LFN w/ osteopenia T-score or -1.9; dexter lumbar spine\par \par - conservative mgmt w/ APAP and turmeric\par - start calcium 500mg BID w/ food and D3 7102-7502 IU daily\par \par RTO 2 years or prn

## 2019-08-29 NOTE — HISTORY OF PRESENT ILLNESS
[FreeTextEntry1] : 53y F here for evaluation of polyarthralgia\par \par - currently has arthralgia affecting hands, L shoulder, L jaw, L knee.  Has trigger finger of 1st R digit (old) and 3rd R digit which is new. R knee pain over last 6 months s/p fall.\par - does not take NSAIDs 2/2 bariatric surgery. Takes turmeric and APAP prn which helps slightly with some of her pain.\par - pain is currently 4/10 a/w 30 min stiffness daily\par - denies any redness, warmth, swelling of joints\par - also reports chronic fatigue rating 1/10 and 6/10 satisfied with ability to do daily activities \par - No personal or family history of IBD or psoriasis. Denies recent tick/insect bite, UTI, STI, or acute GI illness. No family history autoimmune disease\par - pt has been playing ice hockey for 28 years and has been active for many years, she had history of gastric sleeve bariatric surgery due to obesity in the past..  Her father had OA of the spine and sister has osteoporosis.\par - ROS: denies constitutional sxs of fever/chills, weight loss, alopecia, oral/nasal ulcers, sicca sxs, CP/SOB, hematuria/frothy urine, myalgias, Raynaud's, rash, nodules, or photosensitivity. \par  [de-identified] : \par \par All other ROS negative except as mentioned in HPI.

## 2019-08-29 NOTE — PHYSICAL EXAM
[General Appearance - Alert] : alert [General Appearance - In No Acute Distress] : in no acute distress [General Appearance - Well Nourished] : well nourished [General Appearance - Well Developed] : well developed [General Appearance - Well-Appearing] : healthy appearing [Sclera] : the sclera and conjunctiva were normal [PERRL With Normal Accommodation] : pupils were equal in size, round, and reactive to light [Extraocular Movements] : extraocular movements were intact [Outer Ear] : the ears and nose were normal in appearance [Oropharynx] : the oropharynx was normal [Neck Appearance] : the appearance of the neck was normal [Neck Cervical Mass (___cm)] : no neck mass was observed [Jugular Venous Distention Increased] : there was no jugular-venous distention [Thyroid Diffuse Enlargement] : the thyroid was not enlarged [Thyroid Nodule] : there were no palpable thyroid nodules [Respiration, Rhythm And Depth] : normal respiratory rhythm and effort [Auscultation Breath Sounds / Voice Sounds] : lungs were clear to auscultation bilaterally [Heart Rate And Rhythm] : heart rate was normal and rhythm regular [Heart Sounds] : normal S1 and S2 [Heart Sounds Gallop] : no gallops [Murmurs] : no murmurs [Heart Sounds Pericardial Friction Rub] : no pericardial rub [Full Pulse] : the pedal pulses are present [Edema] : there was no peripheral edema [Bowel Sounds] : normal bowel sounds [Abdomen Soft] : soft [Abdomen Tenderness] : non-tender [Abdomen Mass (___ Cm)] : no abdominal mass palpated [No CVA Tenderness] : no ~M costovertebral angle tenderness [No Spinal Tenderness] : no spinal tenderness [Abnormal Walk] : normal gait [Nail Clubbing] : no clubbing  or cyanosis of the fingernails [Musculoskeletal - Swelling] : no joint swelling seen [Motor Tone] : muscle strength and tone were normal [FreeTextEntry1] : \par Hands- OA changes in B/L hands with Heberden and Andre's nodes.  1st R digit and 3rd R digits w/ trigger and mild tenderness/nodule at A1 pulley\par Wrists- normal\par Elbows- normal\par Shoulders- normal on R fairly. L w/ decreased ROM w/ abd/FF to  degrees, tender IR>ER\par Hips- Reduced external rotation bilaterally. \par Knees- Patellofemoral crepitus bilaterally without effusion, R>L.  L knee w/ mild patellar laxity, no subluxation\par Ankles- normal\par Feet- normal\par MMT 10/10 proximally/distally bilaterally.  [Skin Color & Pigmentation] : normal skin color and pigmentation [Skin Turgor] : normal skin turgor [] : no rash [Skin Lesions] : no skin lesions [Deep Tendon Reflexes (DTR)] : deep tendon reflexes were 2+ and symmetric [Sensation] : the sensory exam was normal to light touch and pinprick [Motor Exam] : the motor exam was normal [No Focal Deficits] : no focal deficits [Oriented To Time, Place, And Person] : oriented to person, place, and time [Impaired Insight] : insight and judgment were intact [Affect] : the affect was normal [Mood] : the mood was normal

## 2019-10-03 ENCOUNTER — NON-APPOINTMENT (OUTPATIENT)
Age: 54
End: 2019-10-03

## 2019-10-03 ENCOUNTER — APPOINTMENT (OUTPATIENT)
Dept: CARDIOLOGY | Facility: CLINIC | Age: 54
End: 2019-10-03
Payer: COMMERCIAL

## 2019-10-03 VITALS
HEART RATE: 59 BPM | BODY MASS INDEX: 30.83 KG/M2 | HEIGHT: 63 IN | WEIGHT: 174 LBS | SYSTOLIC BLOOD PRESSURE: 120 MMHG | DIASTOLIC BLOOD PRESSURE: 68 MMHG | OXYGEN SATURATION: 96 %

## 2019-10-03 DIAGNOSIS — R07.89 OTHER CHEST PAIN: ICD-10-CM

## 2019-10-03 PROCEDURE — 99214 OFFICE O/P EST MOD 30 MIN: CPT

## 2019-10-03 PROCEDURE — 93000 ELECTROCARDIOGRAM COMPLETE: CPT

## 2019-10-21 ENCOUNTER — RESULT REVIEW (OUTPATIENT)
Age: 54
End: 2019-10-21

## 2019-12-06 LAB
14-3-3 ETA AG SER IA-MCNC: <0.2 NG/ML
25(OH)D3 SERPL-MCNC: 33.4 NG/ML
ALBUMIN SERPL ELPH-MCNC: 4.7 G/DL
ALP BLD-CCNC: 80 U/L
ALP BONE SERPL-MCNC: 16 MCG/L
ALT SERPL-CCNC: 13 U/L
ANION GAP SERPL CALC-SCNC: 12 MMOL/L
AST SERPL-CCNC: 20 U/L
BASOPHILS # BLD AUTO: 0.07 K/UL
BASOPHILS NFR BLD AUTO: 1.3 %
BILIRUB SERPL-MCNC: 0.6 MG/DL
BUN SERPL-MCNC: 15 MG/DL
CA-I SERPL-SCNC: 1.29 MMOL/L
CALCIUM SERPL-MCNC: 10.2 MG/DL
CALCIUM SERPL-MCNC: 10.4 MG/DL
CHLORIDE SERPL-SCNC: 106 MMOL/L
CO2 SERPL-SCNC: 27 MMOL/L
CREAT SERPL-MCNC: 0.93 MG/DL
CRP SERPL-MCNC: <0.1 MG/DL
EOSINOPHIL # BLD AUTO: 0.12 K/UL
EOSINOPHIL NFR BLD AUTO: 2.2 %
ERYTHROCYTE [SEDIMENTATION RATE] IN BLOOD BY WESTERGREN METHOD: 22 MM/HR
GLUCOSE SERPL-MCNC: 92 MG/DL
HCT VFR BLD CALC: 43.9 %
HGB BLD-MCNC: 13 G/DL
IMM GRANULOCYTES NFR BLD AUTO: 0.2 %
LYMPHOCYTES # BLD AUTO: 1.44 K/UL
LYMPHOCYTES NFR BLD AUTO: 26.1 %
MAGNESIUM SERPL-MCNC: 2.1 MG/DL
MAN DIFF?: NORMAL
MCHC RBC-ENTMCNC: 29.3 PG
MCHC RBC-ENTMCNC: 29.6 GM/DL
MCV RBC AUTO: 98.9 FL
MONOCYTES # BLD AUTO: 0.39 K/UL
MONOCYTES NFR BLD AUTO: 7.1 %
NEUTROPHILS # BLD AUTO: 3.48 K/UL
NEUTROPHILS NFR BLD AUTO: 63.1 %
PARATHYROID HORMONE INTACT: 43 PG/ML
PHOSPHATE SERPL-MCNC: 3.1 MG/DL
PLATELET # BLD AUTO: 188 K/UL
POTASSIUM SERPL-SCNC: 5 MMOL/L
PROT SERPL-MCNC: 7.3 G/DL
RBC # BLD: 4.44 M/UL
RBC # FLD: 14.1 %
SODIUM SERPL-SCNC: 145 MMOL/L
WBC # FLD AUTO: 5.51 K/UL

## 2019-12-15 NOTE — REVIEW OF SYSTEMS
[see HPI] : see HPI [Heartburn] : heartburn [Negative] : Heme/Lymph [Shortness Of Breath] : no shortness of breath [Palpitations] : no palpitations [Chest Pain] : no chest pain

## 2019-12-15 NOTE — PHYSICAL EXAM
[Normal Appearance] : normal appearance [No Deformities] : no deformities [] : no respiratory distress [Respiration, Rhythm And Depth] : normal respiratory rhythm and effort [Exaggerated Use Of Accessory Muscles For Inspiration] : no accessory muscle use [Heart Rate And Rhythm] : heart rate and rhythm were normal [Heart Sounds] : normal S1 and S2 [Bowel Sounds] : normal bowel sounds [Abdomen Soft] : soft [Abdomen Tenderness] : non-tender [Abnormal Walk] : normal gait [Skin Color & Pigmentation] : normal skin color and pigmentation [Affect] : the affect was normal [Mood] : the mood was normal [FreeTextEntry1] : soft [place]

## 2019-12-15 NOTE — ASSESSMENT
[FreeTextEntry1] : Aortic aneurysm s/p repair 7/15\par Family history of aortic disease\par VHD \par Bradycardia resolved. \par Follow up echo\par \par Atypical chest pain, resolved \par Note hiatal hernia repair \par No evidence of ischemic heart disease or LV dysfunction. \par \par History of DVT status post anticoagulation,  IVC filter\par Prior history of TIA\par Continue antiplatelet therapy\par \par Preoperative status [shoulder] \par 10/03/2019 \par At present, there are no active cardiac conditions. \par No recent unstable coronary syndromes, decompensated heart failure, severe valvular heart disease or significant dysrhythmias.  \par Baseline functional status is acceptable .    \par The clinical benefit of the proposed procedure outweighs the associated cardiovascular risk.  \par Risk not attenuated with further CV testing.  \par Prior testing as outlined above.\par Optimized from a cardiovascular perspective.\par SBE ppx\par Remain on aspirin through procedure unless she on concomitant anticoagulation. \par I have asked her to also see hematology prior to surgery due to her history of factor V Leiden, DVT, and pulmonary embolism.\par \par Follow up blood work. \par

## 2019-12-15 NOTE — HISTORY OF PRESENT ILLNESS
[FreeTextEntry1] : DAVY PIRES  is a 53 year old  F\par \par History of breast ca, HTN, obesity s/p gastric sleeve, hiatal hernia repair June 2017 (Dr. Jose Douglas), DVT/PE s/p IVC filter and NOAC, FACUNDO on CPAP and underwent an ascending aortic aneurysm resection and repair using a #28 graft with Dr. Guerrero at [facility], HH, factor V carrier\par  \par Resolution of atypical chest pain\par There is no significant dyspnea on exertion. \par There is no lower extremity edema. \par There are no symptomatic palpitations, lightheadedness, dizziness, or syncope. \par \par There is no prior history of coronary revascularization. \par There is no history of symptomatic congestive heart failure or rheumatic heart disease. \par There is no history of symptomatic arrhythmias including atrial fibrillation.\par \par There is significant osteoarthritis. The arthritis is impacting her quality of life. She is scheduled for a shoulder replacement \par \par Blood work May 2018. Total cholesterol 144, HDL 51, LDL 66.\par \par Carotid Doppler. April 2018. Intimal thickening. \par \par Stress echocardiogram April 2018. Exercised to stage III of Drik protocol. Normal hemodynamic response to exercise. Normal pulmonary pressures response to exercise. No ischemia.\par \par Echocardiogram. June 2017. Normal left ventricular function. Mild mitral regurgitation. No aortic regurgitation. Normal right ventricular function. Normal pulmonary pressures.\par

## 2019-12-17 ENCOUNTER — APPOINTMENT (OUTPATIENT)
Dept: CARDIOLOGY | Facility: CLINIC | Age: 54
End: 2019-12-17
Payer: COMMERCIAL

## 2019-12-17 PROCEDURE — 93306 TTE W/DOPPLER COMPLETE: CPT

## 2019-12-18 ENCOUNTER — APPOINTMENT (OUTPATIENT)
Dept: CARDIOLOGY | Facility: CLINIC | Age: 54
End: 2019-12-18

## 2020-07-10 ENCOUNTER — NON-APPOINTMENT (OUTPATIENT)
Age: 55
End: 2020-07-10

## 2020-07-10 ENCOUNTER — APPOINTMENT (OUTPATIENT)
Dept: CARDIOLOGY | Facility: CLINIC | Age: 55
End: 2020-07-10
Payer: COMMERCIAL

## 2020-07-10 VITALS
HEART RATE: 66 BPM | WEIGHT: 176 LBS | BODY MASS INDEX: 31.18 KG/M2 | OXYGEN SATURATION: 96 % | HEIGHT: 63 IN | SYSTOLIC BLOOD PRESSURE: 132 MMHG | DIASTOLIC BLOOD PRESSURE: 70 MMHG

## 2020-07-10 PROCEDURE — 99214 OFFICE O/P EST MOD 30 MIN: CPT

## 2020-07-10 PROCEDURE — 93000 ELECTROCARDIOGRAM COMPLETE: CPT

## 2020-07-11 NOTE — PHYSICAL EXAM
[Normal Appearance] : normal appearance [No Deformities] : no deformities [] : no respiratory distress [Respiration, Rhythm And Depth] : normal respiratory rhythm and effort [Exaggerated Use Of Accessory Muscles For Inspiration] : no accessory muscle use [Heart Rate And Rhythm] : heart rate and rhythm were normal [Heart Sounds] : normal S1 and S2 [Bowel Sounds] : normal bowel sounds [Abdomen Soft] : soft [Abdomen Tenderness] : non-tender [Abnormal Walk] : normal gait [Skin Color & Pigmentation] : normal skin color and pigmentation [Affect] : the affect was normal [Mood] : the mood was normal [FreeTextEntry1] : grossly normal Male

## 2020-07-11 NOTE — ASSESSMENT
[FreeTextEntry1] : Aortic aneurysm s/p repair 7/15\par Family history of aortic disease\par VHD \par Bradycardia resolved. \par Reviewed echo\par \par Atypical chest pain, resolved \par Note hiatal hernia repair \par No evidence of ischemic heart disease or LV dysfunction. \par \par History of DVT/PE status post anticoagulation,  IVC filter\par Prior history of TIA\par Hematology fllow up\par \par Preoperative status [shoulder] \par 07/10/2020\par At present, there are no active cardiac conditions. \par No recent unstable coronary syndromes, decompensated heart failure, severe valvular heart disease or significant dysrhythmias.  \par Baseline functional status is acceptable.    \par The clinical benefit of the proposed procedure outweighs the associated cardiovascular risk.  \par Risk not attenuated with further CV testing.  \par Prior testing as outlined above.\par Optimized from a cardiovascular perspective.\par SBE ppx\par Follow up with hematology prior to surgery due to her history of factor V Leiden, DVT, and pulmonary embolism.\par \par Follow up blood work. \par

## 2020-07-20 ENCOUNTER — RESULT REVIEW (OUTPATIENT)
Age: 55
End: 2020-07-20

## 2020-07-30 ENCOUNTER — OUTPATIENT (OUTPATIENT)
Dept: OUTPATIENT SERVICES | Facility: HOSPITAL | Age: 55
LOS: 1 days | End: 2020-07-30

## 2020-07-30 ENCOUNTER — INPATIENT (INPATIENT)
Facility: HOSPITAL | Age: 55
LOS: 0 days | Discharge: HOME | End: 2020-07-31
Payer: COMMERCIAL

## 2020-07-30 DIAGNOSIS — Z90.13 ACQUIRED ABSENCE OF BILATERAL BREASTS AND NIPPLES: Chronic | ICD-10-CM

## 2020-07-30 DIAGNOSIS — Z41.1 ENCOUNTER FOR COSMETIC SURGERY: Chronic | ICD-10-CM

## 2020-07-30 DIAGNOSIS — Z98.89 OTHER SPECIFIED POSTPROCEDURAL STATES: Chronic | ICD-10-CM

## 2020-07-30 DIAGNOSIS — R59.1 GENERALIZED ENLARGED LYMPH NODES: Chronic | ICD-10-CM

## 2020-07-30 DIAGNOSIS — Z90.711 ACQUIRED ABSENCE OF UTERUS WITH REMAINING CERVICAL STUMP: Chronic | ICD-10-CM

## 2020-07-30 DIAGNOSIS — Q17.9 CONGENITAL MALFORMATION OF EAR, UNSPECIFIED: Chronic | ICD-10-CM

## 2020-07-30 DIAGNOSIS — Z98.890 OTHER SPECIFIED POSTPROCEDURAL STATES: Chronic | ICD-10-CM

## 2020-07-30 PROCEDURE — 70498 CT ANGIOGRAPHY NECK: CPT | Mod: 26

## 2020-07-30 PROCEDURE — 70450 CT HEAD/BRAIN W/O DYE: CPT | Mod: 26,59

## 2020-07-30 PROCEDURE — 72131 CT LUMBAR SPINE W/O DYE: CPT | Mod: 26

## 2020-07-30 PROCEDURE — 70496 CT ANGIOGRAPHY HEAD: CPT | Mod: 26

## 2020-07-30 PROCEDURE — 99284 EMERGENCY DEPT VISIT MOD MDM: CPT

## 2020-07-30 PROCEDURE — 71045 X-RAY EXAM CHEST 1 VIEW: CPT | Mod: 26

## 2020-07-31 ENCOUNTER — OUTPATIENT (OUTPATIENT)
Dept: OUTPATIENT SERVICES | Facility: HOSPITAL | Age: 55
LOS: 1 days | End: 2020-07-31

## 2020-07-31 ENCOUNTER — TRANSCRIPTION ENCOUNTER (OUTPATIENT)
Age: 55
End: 2020-07-31

## 2020-07-31 DIAGNOSIS — Z41.1 ENCOUNTER FOR COSMETIC SURGERY: Chronic | ICD-10-CM

## 2020-07-31 DIAGNOSIS — Z98.89 OTHER SPECIFIED POSTPROCEDURAL STATES: Chronic | ICD-10-CM

## 2020-07-31 DIAGNOSIS — R59.1 GENERALIZED ENLARGED LYMPH NODES: Chronic | ICD-10-CM

## 2020-07-31 DIAGNOSIS — Z90.711 ACQUIRED ABSENCE OF UTERUS WITH REMAINING CERVICAL STUMP: Chronic | ICD-10-CM

## 2020-07-31 DIAGNOSIS — Z98.890 OTHER SPECIFIED POSTPROCEDURAL STATES: Chronic | ICD-10-CM

## 2020-07-31 DIAGNOSIS — Z90.13 ACQUIRED ABSENCE OF BILATERAL BREASTS AND NIPPLES: Chronic | ICD-10-CM

## 2020-07-31 DIAGNOSIS — Q17.9 CONGENITAL MALFORMATION OF EAR, UNSPECIFIED: Chronic | ICD-10-CM

## 2020-08-05 ENCOUNTER — APPOINTMENT (OUTPATIENT)
Dept: CT IMAGING | Facility: CLINIC | Age: 55
End: 2020-08-05
Payer: COMMERCIAL

## 2020-08-05 ENCOUNTER — RESULT REVIEW (OUTPATIENT)
Age: 55
End: 2020-08-05

## 2020-08-05 PROCEDURE — 74176 CT ABD & PELVIS W/O CONTRAST: CPT

## 2020-09-09 ENCOUNTER — APPOINTMENT (OUTPATIENT)
Dept: ELECTROPHYSIOLOGY | Facility: CLINIC | Age: 55
End: 2020-09-09
Payer: COMMERCIAL

## 2020-09-09 VITALS
SYSTOLIC BLOOD PRESSURE: 152 MMHG | HEART RATE: 49 BPM | BODY MASS INDEX: 31.71 KG/M2 | HEIGHT: 63 IN | OXYGEN SATURATION: 99 % | WEIGHT: 179 LBS | DIASTOLIC BLOOD PRESSURE: 82 MMHG

## 2020-09-09 PROCEDURE — 93000 ELECTROCARDIOGRAM COMPLETE: CPT

## 2020-09-09 PROCEDURE — 99244 OFF/OP CNSLTJ NEW/EST MOD 40: CPT

## 2020-09-26 DIAGNOSIS — Z01.818 ENCOUNTER FOR OTHER PREPROCEDURAL EXAMINATION: ICD-10-CM

## 2020-09-27 ENCOUNTER — APPOINTMENT (OUTPATIENT)
Dept: DISASTER EMERGENCY | Facility: CLINIC | Age: 55
End: 2020-09-27

## 2020-09-28 ENCOUNTER — NON-APPOINTMENT (OUTPATIENT)
Age: 55
End: 2020-09-28

## 2020-09-28 LAB — SARS-COV-2 N GENE NPH QL NAA+PROBE: NOT DETECTED

## 2020-09-28 NOTE — REVIEW OF SYSTEMS
[see HPI] : see HPI [Dizziness] : dizziness [Fever] : no fever [Recent Weight Gain (___ Lbs)] : no recent weight gain [Feeling Fatigued] : not feeling fatigued [Recent Weight Loss (___ Lbs)] : no recent weight loss [Sore Throat] : no sore throat [Palpitations] : no palpitations [Cough] : no cough [Abdominal Pain] : no abdominal pain [Dysuria] : no dysuria [Confusion] : no confusion was observed [Anxiety] : no anxiety [Easy Bleeding] : no tendency for easy bleeding [Easy Bruising] : no tendency for easy bruising

## 2020-09-28 NOTE — DISCUSSION/SUMMARY
[FreeTextEntry1] : Patient is 54 years old and has had evidence of TIA and question of embolic phenomena as evident by the multiple infarcts.  She has not had any documented arrhythmia.  The patient would benefit from an implantable loop monitor.  This procedure including risk benefits and alternatives were discussed.  She is agreeable to proceed with implantable loop monitor.  Would also recommend that she get a DAYANA.  This procedure was also discussed with the patient and she is agreeable.\par \par Plans schedule DAYANA with an implantable loop monitor to follow.

## 2020-09-28 NOTE — PHYSICAL EXAM
[General Appearance - Well Developed] : well developed [General Appearance - In No Acute Distress] : no acute distress [Normal Conjunctiva] : the conjunctiva exhibited no abnormalities [Normal Jugular Venous V Waves Present] : normal jugular venous V waves present [Heart Rate And Rhythm] : heart rate and rhythm were normal [Heart Sounds] : normal S1 and S2 [Murmurs] : no murmurs present [Arterial Pulses Normal] : the arterial pulses were normal [Respiration, Rhythm And Depth] : normal respiratory rhythm and effort [Auscultation Breath Sounds / Voice Sounds] : lungs were clear to auscultation bilaterally [Abdomen Tenderness] : non-tender [Nail Clubbing] : no clubbing of the fingernails [Cyanosis, Localized] : no localized cyanosis [No Venous Stasis] : no venous stasis [Impaired Insight] : insight and judgment were intact

## 2020-09-28 NOTE — HISTORY OF PRESENT ILLNESS
[FreeTextEntry1] : Patient is a 54-year-old woman who is seen in evaluation for possible arrhythmia that may have caused her neurologic event..  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 Jorgensen 2015.  She underwent ascending aortic aneurysm repair on 7/15/2015.\par \par She also has had a prior history of a sleeve gastrectomy in 2014 by Dr. Douglas.  She had a small diaphragmatic hernia which was assessed by Dr. Delaney.  Patient is also had a prior history of DVT and PE and has had previous IVC filter.  She has a history of sleep apnea and has been on CPAP. She also has had bilateral mastectomies with reconstructive surgery and shoulder shoulder lateral shoulder replacement total joint replacement because of history of osteoarthritis.\par Brain MRI showed questionable multiple infarcts.\par  \par The patient was previously taken Xarelto because of prior history of PE.   She is currently not taking Xarelto.

## 2020-09-30 ENCOUNTER — OUTPATIENT (OUTPATIENT)
Dept: OUTPATIENT SERVICES | Facility: HOSPITAL | Age: 55
LOS: 1 days | End: 2020-09-30
Payer: COMMERCIAL

## 2020-09-30 DIAGNOSIS — R59.1 GENERALIZED ENLARGED LYMPH NODES: Chronic | ICD-10-CM

## 2020-09-30 DIAGNOSIS — Z98.89 OTHER SPECIFIED POSTPROCEDURAL STATES: Chronic | ICD-10-CM

## 2020-09-30 DIAGNOSIS — Z90.711 ACQUIRED ABSENCE OF UTERUS WITH REMAINING CERVICAL STUMP: Chronic | ICD-10-CM

## 2020-09-30 DIAGNOSIS — Z98.890 OTHER SPECIFIED POSTPROCEDURAL STATES: Chronic | ICD-10-CM

## 2020-09-30 DIAGNOSIS — Z41.1 ENCOUNTER FOR COSMETIC SURGERY: Chronic | ICD-10-CM

## 2020-09-30 DIAGNOSIS — Q17.9 CONGENITAL MALFORMATION OF EAR, UNSPECIFIED: Chronic | ICD-10-CM

## 2020-09-30 DIAGNOSIS — Z90.13 ACQUIRED ABSENCE OF BILATERAL BREASTS AND NIPPLES: Chronic | ICD-10-CM

## 2020-09-30 PROCEDURE — 33285 INSJ SUBQ CAR RHYTHM MNTR: CPT

## 2020-10-07 ENCOUNTER — APPOINTMENT (OUTPATIENT)
Dept: ELECTROPHYSIOLOGY | Facility: CLINIC | Age: 55
End: 2020-10-07

## 2020-10-07 ENCOUNTER — APPOINTMENT (OUTPATIENT)
Dept: CARDIOLOGY | Facility: CLINIC | Age: 55
End: 2020-10-07
Payer: COMMERCIAL

## 2020-10-07 VITALS
OXYGEN SATURATION: 98 % | BODY MASS INDEX: 31.54 KG/M2 | DIASTOLIC BLOOD PRESSURE: 72 MMHG | TEMPERATURE: 98 F | SYSTOLIC BLOOD PRESSURE: 118 MMHG | HEIGHT: 63 IN | HEART RATE: 53 BPM | WEIGHT: 178 LBS

## 2020-10-07 PROCEDURE — 99024 POSTOP FOLLOW-UP VISIT: CPT

## 2020-10-07 PROCEDURE — 93291 INTERROG DEV EVAL SCRMS IP: CPT

## 2020-10-09 ENCOUNTER — APPOINTMENT (OUTPATIENT)
Dept: CARDIOLOGY | Facility: CLINIC | Age: 55
End: 2020-10-09
Payer: COMMERCIAL

## 2020-10-09 VITALS
HEIGHT: 63 IN | BODY MASS INDEX: 30.83 KG/M2 | SYSTOLIC BLOOD PRESSURE: 112 MMHG | WEIGHT: 174 LBS | DIASTOLIC BLOOD PRESSURE: 70 MMHG | OXYGEN SATURATION: 97 % | HEART RATE: 53 BPM

## 2020-10-09 PROCEDURE — 99215 OFFICE O/P EST HI 40 MIN: CPT

## 2020-10-10 NOTE — ASSESSMENT
[FreeTextEntry1] : Recurrent TIAs\par Favor empiric anticoagulation\par History of obesity status post gastric sleeve and hiatal hernia repair \par possible DAYANA if cleared (h/o gastric sleeve, HH repair etc)\par Records have been requested from [facility], . \par Implantable loop recorder to rule out atrial fibrillation.  \par Follow-up blood work.  \par Increase aspirin to 162.  \par Follow-up with CTS Dr. Jorgensen\par Follow-up with hematology\par Continue physical therapy.  \par Follow-up neurology.  \par \par Aortic aneurysm s/p repair 7/15\par Family history of aortic disease\par Bradycardia resolved. \par Reviewed last echo\par \par Atypical chest pain, resolved \par Note hiatal hernia repair \par No evidence of ischemic heart disease or LV dysfunction. \par \par History of DVT/PE status post anticoagulation,  IVC filter\par Prior history of TIA\par \par All of her questions have been answered

## 2020-10-10 NOTE — REVIEW OF SYSTEMS
[see HPI] : see HPI [Heartburn] : heartburn [Negative] : Heme/Lymph [Shortness Of Breath] : no shortness of breath [Chest Pain] : no chest pain [Palpitations] : no palpitations

## 2020-10-10 NOTE — HISTORY OF PRESENT ILLNESS
[FreeTextEntry1] : DAVY PIRES  is a 54 year old  F\par \par History of breast ca, HTN, obesity s/p gastric sleeve, hiatal hernia repair June 2017, DVT/PE s/p IVC filter and NOAC, FACUNDO on CPAP and underwent an ascending aortic aneurysm resection and repair using a #28 graft with Dr. 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 \par There is no history of symptomatic congestive heart failure or rheumatic heart disease. \par There is no history of symptomatic arrhythmias including atrial fibrillation.\par \par There is no significant dyspnea on exertion. \par There is no lower extremity edema. \par There are no symptomatic palpitations, lightheadedness, dizziness, or syncope. \par \par Last visit she was seen prior to shoulder procedure.  She was maintained on Xarelto perioperatively \par \par Then presented to the hospital with lower back pain, subsequent visual loss, stroke team was called, observed overnight.  \par Subsequently saw [place] neurology.  There were reported ischemic spots in her brain.  \par There is discussion of possible cardioembolic source. \par Question raised re aortic graft\par Prior DAYANA without PFO. \par Referred for implantable loop recorder.\par Also scheduled for DAYANA but this was deferred\par Started on statin therapy.\par \par EKG demonstrates sinus bradycardia \par implantable loop recorder implanted September 2020 \par \par Carotid Doppler. April 2018. Intimal thickening. \par \par Stress echocardiogram April 2018. Exercised to stage III of Dirk protocol. Normal hemodynamic response to exercise. Normal pulmonary pressures response to exercise. No ischemia.\par \par Blood work October 2019 potassium 3.8 creatinine 0.7 LDL 99 total cholesterol 195 \par Echocardiogram December 2019 has normal left ventricular function no aortic regurgitation no pulmonary hypertension

## 2020-10-21 ENCOUNTER — NON-APPOINTMENT (OUTPATIENT)
Age: 55
End: 2020-10-21

## 2020-10-23 NOTE — REASON FOR VISIT
[Home] : at home, [unfilled] , at the time of the visit. [Medical Office: ([facility])___] : at the medical office located in  [Verbal consent obtained from patient] : the patient, [unfilled] [Follow-Up Visit] : a follow-up visit for

## 2020-10-26 ENCOUNTER — APPOINTMENT (OUTPATIENT)
Dept: SURGERY | Facility: CLINIC | Age: 55
End: 2020-10-26
Payer: COMMERCIAL

## 2020-10-26 DIAGNOSIS — E66.9 OBESITY, UNSPECIFIED: ICD-10-CM

## 2020-10-26 PROCEDURE — 99215 OFFICE O/P EST HI 40 MIN: CPT | Mod: 95

## 2020-10-26 NOTE — HISTORY OF PRESENT ILLNESS
[de-identified] : Estefania is a 54 y/o female here for a f/u visit. She is S/P sleeve gastrectomy 7/16/14; Hiatal hernia repair and gastropexy 6/14/17. She was last seen 6/23/17.  The reason for this visit is that her cardiologist would like to do a transesophageal echo given her hiatal hernia repair as well as sleeve he would like to have us be aware and comment the possibility of doing this procedure.  She has not had an upper GI or an endoscopy recently

## 2020-11-05 ENCOUNTER — APPOINTMENT (OUTPATIENT)
Dept: CT IMAGING | Facility: CLINIC | Age: 55
End: 2020-11-05
Payer: COMMERCIAL

## 2020-11-05 ENCOUNTER — RESULT REVIEW (OUTPATIENT)
Age: 55
End: 2020-11-05

## 2020-11-05 PROCEDURE — Q9967B: CUSTOM

## 2020-11-05 PROCEDURE — 71275 CT ANGIOGRAPHY CHEST: CPT

## 2020-11-05 PROCEDURE — 99072 ADDL SUPL MATRL&STAF TM PHE: CPT

## 2020-11-13 ENCOUNTER — APPOINTMENT (OUTPATIENT)
Dept: CARDIOLOGY | Facility: CLINIC | Age: 55
End: 2020-11-13
Payer: COMMERCIAL

## 2020-11-13 VITALS
TEMPERATURE: 98 F | DIASTOLIC BLOOD PRESSURE: 72 MMHG | OXYGEN SATURATION: 98 % | BODY MASS INDEX: 31.18 KG/M2 | HEART RATE: 51 BPM | WEIGHT: 176 LBS | HEIGHT: 63 IN | SYSTOLIC BLOOD PRESSURE: 110 MMHG

## 2020-11-13 DIAGNOSIS — Z00.00 ENCOUNTER FOR GENERAL ADULT MEDICAL EXAMINATION W/OUT ABNORMAL FINDINGS: ICD-10-CM

## 2020-11-13 PROCEDURE — 93298 REM INTERROG DEV EVAL SCRMS: CPT

## 2020-11-13 PROCEDURE — 99214 OFFICE O/P EST MOD 30 MIN: CPT

## 2020-11-13 PROCEDURE — G2066: CPT

## 2020-11-13 PROCEDURE — 99072 ADDL SUPL MATRL&STAF TM PHE: CPT

## 2020-11-13 RX ORDER — RIVAROXABAN 15 MG/1
15 TABLET, FILM COATED ORAL
Qty: 42 | Refills: 0 | Status: DISCONTINUED | COMMUNITY
Start: 2020-07-08 | End: 2020-11-13

## 2020-11-13 RX ORDER — CIPROFLOXACIN HYDROCHLORIDE 250 MG/1
250 TABLET, FILM COATED ORAL
Qty: 6 | Refills: 0 | Status: DISCONTINUED | COMMUNITY
Start: 2020-07-31 | End: 2020-11-13

## 2020-11-13 RX ORDER — OXYCODONE 5 MG/1
5 TABLET ORAL
Qty: 42 | Refills: 0 | Status: DISCONTINUED | COMMUNITY
Start: 2020-07-21 | End: 2020-11-13

## 2020-11-13 RX ORDER — AMOXICILLIN AND CLAVULANATE POTASSIUM 875; 125 MG/1; MG/1
875-125 TABLET, COATED ORAL
Qty: 14 | Refills: 0 | Status: DISCONTINUED | COMMUNITY
Start: 2020-08-19 | End: 2020-11-13

## 2020-11-13 NOTE — ASSESSMENT
[FreeTextEntry1] : Remote LNq Summary\par \par \par 11-13-20 \par Viewed lnq summary 9-30-20 to 11-13-20. \par Sx was read as SR. \par Seen by Nehemias on 10-7-20\par Patient has f/u with SD today\par Next summary on pa schedule 32d-cv

## 2020-11-14 NOTE — HISTORY OF PRESENT ILLNESS
[FreeTextEntry1] : DAVY PIRES  is a 55 year old  F\par \par History of breast ca, HTN, obesity s/p gastric sleeve, hiatal hernia repair June 2017, DVT/PE s/p IVC filter and NOAC, FACUNDO on CPAP and underwent an ascending aortic aneurysm resection and repair using a #28 graft with Dr. 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 \par There is no history of symptomatic congestive heart failure or rheumatic heart disease. \par There is no history of symptomatic arrhythmias including atrial fibrillation.\par \par There is no significant dyspnea on exertion. \par There is no lower extremity edema. \par There are no symptomatic palpitations, lightheadedness, dizziness, or syncope. \par \par Maintained on Xarelto perioperatively \par \par Recently presented to the hospital with lower back pain, subsequent visual loss, stroke team was called, observed overnight.  \par Subsequently saw [place] neurology.  There were reported ischemic spots in her brain.  \par There is discussion of possible cardioembolic source. \par Prior DAYANA without PFO. \par Referred for implantable loop recorder.\par Also scheduled for DAYANA but this was deferred\par Discussed with Stella and neurology\par Scheduled to see karin\par \par CT scan describes stable exam replacement of ascending aorta no dissection or intramural hematoma \par loop recorder without events \par \par Carotid Doppler. April 2018. Intimal thickening. \par \par Stress echocardiogram April 2018. Exercised to stage III of Dirk protocol. Normal hemodynamic response to exercise. Normal pulmonary pressures response to exercise. No ischemia.\par \par Blood work November 2020 hemoglobin 12.4 total cholesterol 104 LDL 20 \par \par Echocardiogram December 2019 has normal left ventricular function no aortic regurgitation no pulmonary hypertension\par

## 2020-11-14 NOTE — ASSESSMENT
[FreeTextEntry1] : Recurrent TIAs\par History of obesity status post gastric sleeve and hiatal hernia repair \par Prior DAYANA w/o PFO\par Repeat DAYANA deferred\par If transesophageal echocardiogram needs barium swallow and to be performed at [facility] \par Implantable loop recorder to rule out atrial fibrillation.  \par Follow-up blood work.  \par Continue ASA\par Lowered her dose of Lipitor to 20 mg \par Copy of recent blood work has been sent to neurologist \par Given copy of recent CT scan \par Clinical follow-up will be scheduled in 6 months\par Follow-up with hematology\par Follow-up neurology.  \par \par Aortic aneurysm s/p repair 7/15\par Family history of aortic disease\par Bradycardia resolved. \par Reviewed last echo\par \par Atypical chest pain, resolved \par Note hiatal hernia repair \par No evidence of ischemic heart disease or LV dysfunction. \par \par History of DVT/PE status post anticoagulation,  IVC filter\par Prior history of TIA\par \par All of her questions have been answered\par

## 2020-12-18 ENCOUNTER — APPOINTMENT (OUTPATIENT)
Dept: CARDIOLOGY | Facility: CLINIC | Age: 55
End: 2020-12-18
Payer: COMMERCIAL

## 2020-12-18 PROCEDURE — 93298 REM INTERROG DEV EVAL SCRMS: CPT

## 2020-12-18 PROCEDURE — G2066: CPT

## 2020-12-18 NOTE — ASSESSMENT
[FreeTextEntry1] : Remote LNq Summary\par \par 12-18-20\par Viewed lnq summary 11-13-20 to 12-18-20. \par No events were noted. \par Seen by NAHID 9-9-20 and Nehemias 10-7-20\par NExt summary on pa schedule 32d-cv

## 2021-01-25 ENCOUNTER — APPOINTMENT (OUTPATIENT)
Dept: CARDIOLOGY | Facility: CLINIC | Age: 56
End: 2021-01-25
Payer: COMMERCIAL

## 2021-01-25 PROCEDURE — G2066: CPT

## 2021-01-25 PROCEDURE — 93298 REM INTERROG DEV EVAL SCRMS: CPT

## 2021-01-25 NOTE — PROCEDURE
[de-identified] : WOLF [de-identified] : [id number] [de-identified] : [id number] [de-identified] : 9/30/20 [de-identified] : 1/25/21 summary report from 12/19/20 to 1/22/21 viewed as NSR and no new events - next summary in 32 days - BHARAT

## 2021-02-12 ENCOUNTER — NON-APPOINTMENT (OUTPATIENT)
Age: 56
End: 2021-02-12

## 2021-02-12 ENCOUNTER — APPOINTMENT (OUTPATIENT)
Dept: CARDIOLOGY | Facility: CLINIC | Age: 56
End: 2021-02-12
Payer: COMMERCIAL

## 2021-02-12 VITALS
OXYGEN SATURATION: 97 % | HEIGHT: 63 IN | BODY MASS INDEX: 31.89 KG/M2 | WEIGHT: 180 LBS | SYSTOLIC BLOOD PRESSURE: 128 MMHG | TEMPERATURE: 96.8 F | HEART RATE: 67 BPM | DIASTOLIC BLOOD PRESSURE: 82 MMHG

## 2021-02-12 DIAGNOSIS — G47.33 OBSTRUCTIVE SLEEP APNEA (ADULT) (PEDIATRIC): ICD-10-CM

## 2021-02-12 DIAGNOSIS — R00.2 PALPITATIONS: ICD-10-CM

## 2021-02-12 PROCEDURE — 99214 OFFICE O/P EST MOD 30 MIN: CPT

## 2021-02-12 PROCEDURE — 99072 ADDL SUPL MATRL&STAF TM PHE: CPT

## 2021-02-12 PROCEDURE — 93000 ELECTROCARDIOGRAM COMPLETE: CPT

## 2021-02-12 RX ORDER — ATORVASTATIN CALCIUM 20 MG/1
20 TABLET, FILM COATED ORAL DAILY
Qty: 90 | Refills: 0 | Status: DISCONTINUED | COMMUNITY
End: 2021-02-12

## 2021-02-12 NOTE — HISTORY OF PRESENT ILLNESS
[FreeTextEntry1] : DAVY PIRES  is a 55 year old  F with a history of breast ca, HTN, obesity s/p gastric sleeve, hiatal hernia repair June 2017, DVT/PE s/p IVC filter and NOAC, FACUNDO on CPAP and underwent an ascending aortic aneurysm resection and repair using a #28 graft with Dr. 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 \par There is no history of symptomatic congestive heart failure or rheumatic heart disease. \par There is no history of symptomatic arrhythmias including atrial fibrillation.\par \par Previously presented to the hospital with lower back pain, subsequent visual loss, stroke team was called, observed overnight.  \par Subsequently saw [place] neurology.  There were reported ischemic spots in her brain.  \par There is discussion of possible cardioembolic source. \par Prior DAYANA without PFO. \par Referred for implantable loop recorder.\par Also scheduled for DAYANA but this was deferred\par Discussed with Stella and neurology\par Scheduled to see karin\par \par Last seen 11/13/20. Repeat DAYANA deferred. Note hx of gastric sleeve.  If transesophageal echocardiogram needs barium swallow and to be performed at [facility]. Presents today, 2/12/21, for cardiac clearance prior to total shoulder revision 3/1/21 at [facility] with Dr. Kaur. In the interim there have been no hospitalizations or procedures. She denies chest pain, pressure, palpitations, unusual shortness of breath, orthopnea, LE edema, lightheadedness, dizziness, near syncope or syncope. Has ocular migraines for which she follows with neuro, Dr. Yu Aguayo. Reports significant heart burn for which she needs to take either or Famotidine or up to Tums up to 6-8x a day. Former smoker; quit age 26. Not on a formal exercise regimen.\par Has ILR to rule out atrial fibrillation. LINQ summary 12/18/20 and 1/25/21, no events. \par \par Follows with roc [facility].\par \par Follows with neuro, Dr. Yu Aguayo.\par \par \par Testing:\par \par EKG 2/12/21: SB at 58 bpm, possible old inferior infarct, KS interval 170 ms, QTc 429 ms, nonspecific ST-T wave abnormalities \par \par CT scan describes stable exam replacement of ascending aorta no dissection or intramural hematoma \par loop recorder without events \par \par Carotid Doppler. April 2018. Intimal thickening. \par \par Stress echocardiogram April 2018. Exercised to stage III of Dirk protocol. Normal hemodynamic response to exercise. Normal pulmonary pressures response to exercise. No ischemia.\par \par Blood work November 2020 hemoglobin 12.4 total cholesterol 104 LDL 20 \par \par Echocardiogram December 2019 has normal left ventricular function no aortic regurgitation no pulmonary hypertension\par

## 2021-02-12 NOTE — PHYSICAL EXAM
[Normal Appearance] : normal appearance [No Deformities] : no deformities [Respiration, Rhythm And Depth] : normal respiratory rhythm and effort [Exaggerated Use Of Accessory Muscles For Inspiration] : no accessory muscle use [Heart Rate And Rhythm] : heart rate and rhythm were normal [Heart Sounds] : normal S1 and S2 [Bowel Sounds] : normal bowel sounds [Abdomen Soft] : soft [Abdomen Tenderness] : non-tender [Abnormal Walk] : normal gait [Skin Color & Pigmentation] : normal skin color and pigmentation [Affect] : the affect was normal [Mood] : the mood was normal [Normal Jugular Venous A Waves Present] : normal jugular venous A waves present [Normal Jugular Venous V Waves Present] : normal jugular venous V waves present [No Jugular Venous Malin A Waves] : no jugular venous malin A waves [Nail Clubbing] : no clubbing of the fingernails [Cyanosis, Localized] : no localized cyanosis [Petechial Hemorrhages (___cm)] : no petechial hemorrhages [] : no ischemic changes [FreeTextEntry1] : soft [place]

## 2021-02-12 NOTE — REVIEW OF SYSTEMS
[see HPI] : see HPI [Negative] : Heme/Lymph [Shortness Of Breath] : no shortness of breath [Chest Pain] : no chest pain [Palpitations] : no palpitations [Heartburn] : no heartburn [FreeTextEntry1] : heartburn

## 2021-02-12 NOTE — ASSESSMENT
[FreeTextEntry1] : DAVY PIRES is a 55 year old F who presents today Feb 12, 2021 with the above history and the following active issues:\par \par Significant heart burn and abnormal EKG noted today. Would recommend Obtain a nuclear stress test to evaluate for evidence of myocardial ischemia. The patient may not be able to walk on the treadmill. Obtain 2d echocardiogram to evaluate resting heart structure, valvular function, and LVEF. Recommendations for clearance from a cardiac standpoint will be made after the above testing is completed.\par \par Recurrent TIAs\par History of obesity status post gastric sleeve and hiatal hernia repair \par Prior DAYANA w/o PFO\par Repeat DAYANA deferred\par If transesophageal echocardiogram needs barium swallow and to be performed at [facility] \par Continue to monitor implantable loop recorder to rule out atrial fibrillation.  \par  \par Continue ASA\par Continue Lipitor 10mg daily.\par Continue Eliquis 5mg BID. Monitor H/H and Cr.\par \par \par Aortic aneurysm s/p repair 7/15\par Family history of aortic disease\par Bradycardia resolved. \par \par \par Note hiatal hernia repair \par \par \par History of DVT/PE status post anticoagulation,  IVC filter\par Prior history of TIA\par \par Continue follow up with PCP\par Follow-up with hematology\par Follow-up neurology.  \par \par F/U after testing to review results.\par Discussed red flag symptoms, which would warrant sooner or emergent medical evaluation.\par Any questions and concerns were addressed and resolved.\par \par Sincerely,\par Marisela Mohr FNP-BC\par Patient's history, testing, and plan was reviewed with supervising physician, Dr. Ever Champion\par

## 2021-02-15 ENCOUNTER — APPOINTMENT (OUTPATIENT)
Dept: CARDIOLOGY | Facility: CLINIC | Age: 56
End: 2021-02-15
Payer: COMMERCIAL

## 2021-02-15 PROCEDURE — 99072 ADDL SUPL MATRL&STAF TM PHE: CPT

## 2021-02-15 PROCEDURE — 93306 TTE W/DOPPLER COMPLETE: CPT

## 2021-02-17 NOTE — H&P PST ADULT - VASCULAR
Basic Information  Admitted From:  Date/Time of Birth: [birth year] @ 0738     Date/Time admitted to NICU: _        _ Emergency Department     x Labor and Delivery     _ [place] [facility]     _ Referring Hospital: _  Reason for NICU Consult:   twins, maternal PIH, MSAF  INGRID/EDC:  18  Gestational age by dates:  36 weeks, 1 days  Maternal History:  General Information:  Maternal age:  32 yrs  : 2  Para: 1  1 full term     0 premature     0 abortions     0 spontaneous abortions     0 induced abortions     1 living  Ethnicity/Race:   Current Pregnancy:    Last ultrasound date:  _  Multiple gestation: Yes - twins  Prenatal medications:  x Magnesium sulfate+     _ Indomethacin+     x Prenatal Vitamins  Antibiotics:  None      # of doses: _     Last dose received: _  Corticosteroids:  None     # of doses: _     Last dose received: _  Past History:  Obstetrical:   Medical: Noncontributory  Social History:  x      _ Single     x Denies alcohol, tobacco and drug use  _ Abuse concerns     _ Alcohol use+     _ Drug use+     _ Tobacco use  Family History:  Nocontributory  Prenatal labs: _  Blood type: B+  RPR: NR  Hepatitis B: Neg  Hepatitis C: _  HIV: Neg  Group B Strep: Neg  GC Testing: Neg  Rubella: immune  TB: _   History:  _ Full term     x Normal vaginal delivery     _ Uncomplicated  delivery  Birth:  x This facility     _ Other facility: _     x Delivering provider Dr. Louis     _ Referring provider _  Labor: x Induced     _ Spontaneous     _ Augmented  Analgesic: Epidural     # of doses received: _     Last dose received: _  Membranes:  Method of rupture: SROM  Date time of rupture: 18  Length of time ruptured: 5 minutes   Nature of fluid:  _ clear     x meconium stained     _ bloody  [place] Information:  Delivery:   Management:  Routine warm, dry, clear airway, tactile stimulation  Suctioned:  x bulb syringe     _ wall suction     _ meconium  suctioned from below the cords     x fluid - meconium stained  APGAR score 1 minute: Total score 8 /10  APGAR score 5 minute: Total score 9 /10  APGAR score 10 minute: Total score _ /10  Cord pH obtained:  pH = _     Narrative Summary  Normal transition noted initially, mild resp distress noted, given CPAP in OR for two minutes, allowed to continue to transition. Baby was   transferred from mom's room for tachypnea.     Physical Examination  Gestational Age/Whitley Score  Neuromuscular maturity score: _  Maturity rating: score/get age in weeks: 36  VS/Measurements: _  Growth Parameters at birth: _  Weight: 2240 grams,  Length: _cms  Head circumference: _cms  Infant blood type: _  Woody (direct): _         Normal Exam Notable Findings   General:   Mild Respiratory distress.   x mild distress     Eye: PERRL, normal conjunctiva, anterior vascular capsule.  X      HENT:   Normocephalic, nares present, palate intact, TM's clear, normal hearing, moist oral mucosa, no pharyngeal erythema, anterior fontanelle open/soft/flat, ear canals present, ears normally set and rotated, no sinus tenderness.  X     Neck: Supple, nontender, no carotid bruit, no lymphadenopathy, no thyromegaly, full range of motion, clavicles intact.  X     Respiratory:  Lungs CTA, BS equal, symmetrical expansion, no chest wall tenderness.     Tachypnea and Grunting - on RA   Cardiovascular:  Normal rate, regular rhythm, no murmur, no gallop, good pulses in all extremities, normal peripheral perfusion, no edema.  X     Gastrointestinal:  Soft, non-tender, non-distended, normal bowel sounds, no organomegaly, 3 vessel umbilical cord, anus patent.   X     Genitourinary: No CVA tenderness, normal genitalia for age & sex, no scrotal tenderness, no inguinal tenderness, no lesions.   X  normal  female   Musculoskeletal:  Normal ROM, normal strength, no tenderness, no swelling, no deformity, no hip clicks, normal Hillman's, normal Ortolani's.   X      Integumentary:  Warm, dry, pink, intact, moist, no pallor, no rash.   X    Neurologic:  Alert, normal sensory, normal motor, moves all extremities appropriately, no focal deficits, CN II-XII intact, gag reflex normal, normal DTR's.   X           Review/Management  Results: _  Interpretation:    female, 36w 1d, twin 1 of 2, MSAF with mild resp distress,  affected by maternal PIH; Admit to SCN.     Impression and Plan  : Z38.30, P07.39, P07.30, P 03.82, P00.0  Diagnosis:   female, 36w 1d, twin 1 of 2, MSAF with mild resp distress - TTN     Condition:      _ Stable     x Fair     _ Guarded     _ Serious     _ Critical     _ Unchanged  Admission:  Admit to: [facility]  Plan: NPO; PIV- IVF D10W at 7.5 ml/hr; STAT Accu check; CBC, BLD Cx.  Antibiotics: No risk for Antibiotics  Consults: _  Education and Follow-Up:   D/W RN at [facility]. D/W Dad at bedside.          Equal and normal pulses (carotid, femoral, dorsalis pedis)

## 2021-02-19 ENCOUNTER — APPOINTMENT (OUTPATIENT)
Dept: CARDIOLOGY | Facility: CLINIC | Age: 56
End: 2021-02-19
Payer: COMMERCIAL

## 2021-02-19 PROCEDURE — 99072 ADDL SUPL MATRL&STAF TM PHE: CPT

## 2021-02-19 PROCEDURE — A9502: CPT

## 2021-02-19 PROCEDURE — 93015 CV STRESS TEST SUPVJ I&R: CPT

## 2021-02-19 PROCEDURE — 78452 HT MUSCLE IMAGE SPECT MULT: CPT

## 2021-02-22 ENCOUNTER — APPOINTMENT (OUTPATIENT)
Dept: CARDIOLOGY | Facility: CLINIC | Age: 56
End: 2021-02-22
Payer: COMMERCIAL

## 2021-02-22 PROCEDURE — 93298 REM INTERROG DEV EVAL SCRMS: CPT

## 2021-02-22 PROCEDURE — G2066: CPT

## 2021-02-23 ENCOUNTER — APPOINTMENT (OUTPATIENT)
Dept: CARDIOLOGY | Facility: CLINIC | Age: 56
End: 2021-02-23
Payer: COMMERCIAL

## 2021-02-23 ENCOUNTER — NON-APPOINTMENT (OUTPATIENT)
Age: 56
End: 2021-02-23

## 2021-02-23 VITALS
HEIGHT: 63 IN | WEIGHT: 180 LBS | DIASTOLIC BLOOD PRESSURE: 68 MMHG | SYSTOLIC BLOOD PRESSURE: 118 MMHG | OXYGEN SATURATION: 96 % | BODY MASS INDEX: 31.89 KG/M2 | HEART RATE: 63 BPM

## 2021-02-23 DIAGNOSIS — Z01.810 ENCOUNTER FOR PREPROCEDURAL CARDIOVASCULAR EXAMINATION: ICD-10-CM

## 2021-02-23 DIAGNOSIS — K21.9 GASTRO-ESOPHAGEAL REFLUX DISEASE W/OUT ESOPHAGITIS: ICD-10-CM

## 2021-02-23 PROCEDURE — 99072 ADDL SUPL MATRL&STAF TM PHE: CPT

## 2021-02-23 PROCEDURE — 99214 OFFICE O/P EST MOD 30 MIN: CPT

## 2021-02-23 NOTE — ASSESSMENT
[FreeTextEntry1] : DAVY PIRES is a 55 year old F who presents today Feb 23, 2021 with the above history and the following active issues:\par \par \par Heartburn. No high risk features on echo or nuclear stress testing. Recommend f/u with gastro.\par \par Recurrent TIAs\par History of obesity status post gastric sleeve and hiatal hernia repair \par Prior DAYANA w/o PFO\par Repeat DAYANA deferred\par If transesophageal echocardiogram needs barium swallow and to be performed at [facility] \par Continue to monitor implantable loop recorder to rule out atrial fibrillation.  \par \par Preoperative cardiovascular examination.\par Patient may proceed with total shoulder revision 3/1/21 at [facility] with Dr. Kaur.\par At present, there are no active cardiac conditions. \par No recent unstable coronary syndromes, decompensated heart failure, severe valvular heart disease or significant dysrhythmias.  \par Baseline functional status is acceptable.    \par The clinical benefit of the proposed procedure outweighs the associated cardiovascular risk.  \par Risk not attenuated with further CV testing.  \par Prior testing as outlined above.\par Optimized from a cardiovascular perspective.\par DVT ppx\par Recommendations per Hematology/oncology:\par Hold ASA one week in advance of sx. Resume asap.\par Take last dose of Eliquis on 2/26/21 PM and resume 3/4/21 am\par Take Lovenox 2/27, 2/28, 3/2, and 3/3/21 am.\par  \par Continue ASA\par Continue Lipitor 20mg daily.\par Continue Eliquis 5mg BID. Monitor H/H and Cr.\par \par \par Aortic aneurysm s/p repair 7/15\par Family history of aortic disease\par Bradycardia resolved. \par \par \par Note hiatal hernia repair \par \par \par History of DVT/PE status post anticoagulation,  IVC filter\par Prior history of TIA\par \par Continue follow up with PCP\par Antiphospholipid syndrome: Follow-up with hematology\par Follow-up neurology.  \par \par F/U in 3 months with Dr. Easton.\par Discussed red flag symptoms, which would warrant sooner or emergent medical evaluation.\par Any questions and concerns were addressed and resolved.\par \par Sincerely,\par Marisela Mohr FN-BC\par Patient's history, testing, and plan was reviewed with supervising physician, Dr. Amarjit Easton\par \par

## 2021-02-23 NOTE — HISTORY OF PRESENT ILLNESS
[FreeTextEntry1] : DAVY PIRES  is a 55 year old  F with a history of breast ca, HTN, obesity s/p gastric sleeve, hiatal hernia repair June 2017, DVT/PE s/p IVC filter and NOAC, FACUNDO on CPAP and underwent an ascending aortic aneurysm resection and repair using a #28 graft with Dr. 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 \par There is no history of symptomatic congestive heart failure or rheumatic heart disease. \par There is no history of symptomatic arrhythmias including atrial fibrillation.\par \par Previously presented to the hospital with lower back pain, subsequent visual loss, stroke team was called, observed overnight.  \par Subsequently saw [place] [facility].  There were reported ischemic spots in her brain.  \par There is discussion of possible cardioembolic source. \par Prior DAYANA without PFO. \par Referred for implantable loop recorder.\par Also scheduled for DAYANA but this was deferred\par Discussed with St[place] and neurology\par Scheduled to see karin\par \par Repeat DAYANA deferred. Note hx of gastric sleeve.  If transesophageal echocardiogram needs barium swallow and to be performed at [facility]. \par \par Last seen 2/12/21 for cardiac clearance prior to total shoulder revision 3/1/21 at [facility] [place] with Dr. Kaur. There was heartburn reported which required the need for Famotidine and up to Tums 6-8x a day. EKG was abnormal when seen 2/12/21.  Echo and nuclear stress testing were ordered. See details below. In the interim there have been no hospitalizations or procedures. She denies chest pain, pressure, palpitations, unusual shortness of breath, orthopnea, LE edema, lightheadedness, dizziness, near syncope or syncope. Still has some reflux/dysphagia.  Former smoker; quit age 26. Not on a formal exercise regimen.\par Has ILR to rule out atrial fibrillation. LINQ summary 12/18/20 and 1/25/21, no events. \par Has ocular migraines for which she follows with neuro, Dr. Yu Aguayo. \par \par \par Follows with heme Dr. Oliva.\par \par \par \par Testing:\par \par Labs 2/19/21: Gluc 115, Cr 0.72, Na 143, K 3.8, Ca 9.6, ALT 34, AST 34\par \par Nukclear stress test 2/19/21: Lexiscan. No evidence of ischemia by EKG. There is a small, mild defect in apical wall that is fixed, suggestive of infarct. Clinical correlation suggested given hx of gastric sleeve and hiatal hernia. Post stress gated wall motion anaylsis; EF 57%, revealing hypokinesis in apical walls.\par \par Echo 2/15/21: EF 62%. Mild MR. Severely dilated LA. Normal wall motion. Mild TR. Mild aortic arch dilatation. Compared with eho 12/17/19, no sig changes noted.\par \par Labs 2/19/21: Gluc 115, Cr 0.72, Na 143, K 3.8, Ca 9.6, ALT 34, AST 34, \par \par EKG 2/12/21: SB at 58 bpm, possible old inferior infarct, OK interval 170 ms, QTc 429 ms, nonspecific ST-T wave abnormalities \par \par CT scan describes stable exam replacement of ascending aorta no dissection or intramural hematoma \par loop recorder without events \par \par Carotid Doppler. April 2018. Intimal thickening. \par \par Stress echocardiogram April 2018. Exercised to stage III of Dirk protocol. Normal hemodynamic response to exercise. Normal pulmonary pressures response to exercise. No ischemia.\par \par Blood work November 2020 hemoglobin 12.4 total cholesterol 104 LDL 20 \par \par Echocardiogram December 2019 has normal left ventricular function no aortic regurgitation no pulmonary hypertension\par

## 2021-02-23 NOTE — PHYSICAL EXAM
[Normal Appearance] : normal appearance [No Deformities] : no deformities [Normal Jugular Venous A Waves Present] : normal jugular venous A waves present [No Jugular Venous Malin A Waves] : no jugular venous malin A waves [Normal Jugular Venous V Waves Present] : normal jugular venous V waves present [Respiration, Rhythm And Depth] : normal respiratory rhythm and effort [Exaggerated Use Of Accessory Muscles For Inspiration] : no accessory muscle use [Heart Sounds] : normal S1 and S2 [Heart Rate And Rhythm] : heart rate and rhythm were normal [Abdomen Soft] : soft [Bowel Sounds] : normal bowel sounds [Abdomen Tenderness] : non-tender [Abnormal Walk] : normal gait [Cyanosis, Localized] : no localized cyanosis [Nail Clubbing] : no clubbing of the fingernails [Petechial Hemorrhages (___cm)] : no petechial hemorrhages [] : no ischemic changes [Skin Color & Pigmentation] : normal skin color and pigmentation [Affect] : the affect was normal [Mood] : the mood was normal [FreeTextEntry1] : soft [place]

## 2021-02-23 NOTE — ADDENDUM
[FreeTextEntry1] : Please note the patient was reviewed with NP Marisela salgado.\ramone I was physically present during the service of the patient.\ramone I was directly involved in the management plan and recommendations of the care provided to the patient. \ramone I personally reviewed the history and physical examination as documented by the NP above.\par Feb 23 2021  2:30PM\par

## 2021-03-01 ENCOUNTER — RESULT REVIEW (OUTPATIENT)
Age: 56
End: 2021-03-01

## 2021-03-23 ENCOUNTER — APPOINTMENT (OUTPATIENT)
Dept: CARDIOLOGY | Facility: CLINIC | Age: 56
End: 2021-03-23
Payer: COMMERCIAL

## 2021-03-23 ENCOUNTER — NON-APPOINTMENT (OUTPATIENT)
Age: 56
End: 2021-03-23

## 2021-03-23 PROCEDURE — G2066: CPT

## 2021-03-23 PROCEDURE — 93298 REM INTERROG DEV EVAL SCRMS: CPT

## 2021-05-04 ENCOUNTER — APPOINTMENT (OUTPATIENT)
Dept: CARDIOLOGY | Facility: CLINIC | Age: 56
End: 2021-05-04
Payer: COMMERCIAL

## 2021-05-04 ENCOUNTER — NON-APPOINTMENT (OUTPATIENT)
Age: 56
End: 2021-05-04

## 2021-05-04 PROCEDURE — 93298 REM INTERROG DEV EVAL SCRMS: CPT

## 2021-05-04 PROCEDURE — G2066: CPT

## 2021-05-07 ENCOUNTER — APPOINTMENT (OUTPATIENT)
Dept: CARDIOLOGY | Facility: CLINIC | Age: 56
End: 2021-05-07

## 2021-06-08 ENCOUNTER — APPOINTMENT (OUTPATIENT)
Dept: CARDIOLOGY | Facility: CLINIC | Age: 56
End: 2021-06-08
Payer: COMMERCIAL

## 2021-06-08 ENCOUNTER — NON-APPOINTMENT (OUTPATIENT)
Age: 56
End: 2021-06-08

## 2021-06-08 PROCEDURE — G2066: CPT

## 2021-06-08 PROCEDURE — 93298 REM INTERROG DEV EVAL SCRMS: CPT

## 2021-06-11 ENCOUNTER — APPOINTMENT (OUTPATIENT)
Dept: CARDIOLOGY | Facility: CLINIC | Age: 56
End: 2021-06-11

## 2021-07-13 ENCOUNTER — APPOINTMENT (OUTPATIENT)
Dept: CARDIOLOGY | Facility: CLINIC | Age: 56
End: 2021-07-13
Payer: COMMERCIAL

## 2021-07-13 ENCOUNTER — NON-APPOINTMENT (OUTPATIENT)
Age: 56
End: 2021-07-13

## 2021-07-13 PROCEDURE — 93298 REM INTERROG DEV EVAL SCRMS: CPT

## 2021-07-13 PROCEDURE — G2066: CPT

## 2021-07-20 ENCOUNTER — APPOINTMENT (OUTPATIENT)
Dept: CARDIOLOGY | Facility: CLINIC | Age: 56
End: 2021-07-20
Payer: COMMERCIAL

## 2021-07-20 VITALS
HEART RATE: 73 BPM | DIASTOLIC BLOOD PRESSURE: 80 MMHG | TEMPERATURE: 97.8 F | SYSTOLIC BLOOD PRESSURE: 118 MMHG | WEIGHT: 182 LBS | BODY MASS INDEX: 32.24 KG/M2 | OXYGEN SATURATION: 95 %

## 2021-07-20 PROCEDURE — 99072 ADDL SUPL MATRL&STAF TM PHE: CPT

## 2021-07-20 PROCEDURE — 99214 OFFICE O/P EST MOD 30 MIN: CPT

## 2021-07-20 NOTE — HISTORY OF PRESENT ILLNESS
[FreeTextEntry1] : DAVY PIRES  is a 55 year old  F with a history of breast ca, HTN, obesity s/p gastric sleeve, hiatal hernia repair June 2017, DVT/PE s/p IVC filter and NOAC, FACUNDO on CPAP, ascending aortic aneurysm repair using a #28 graft with Dr. 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 \par There is no history of symptomatic congestive heart failure or rheumatic heart disease. \par There is no history of symptomatic arrhythmias including atrial fibrillation.\par \par Since dx of antiphospholipid syndrome by Dr. Oliva and on OAC and ASA there has been no recurrent neuro events. \par Prior DAYANA without PFO. \par ILR 10 months no AF thus far. \par No significant VHD. \par Also sees Dr. Yu Girard [facility] neuro. \par \par There has been no recent illness or hospitalization. \par Well recovered from shoulder surgery Feb '21 with full ROM\par Walking >10,000 steps per day. \par Denies exertional chest pain or discomfort. Denies unusual shortness of breath, orthopnea, weight gain, or LE edema. Denies palpitations, lightheadedness, dizziness, or syncope.  Denies any unusual bleeding or black/tarry stools. \par \par Her main complaint to day is significant myaglias on lipitor 20mg daily. Last lipids Nov '20 when on lip 40 LDL was 20. Statin rx is mainly for prevention. \par \par \par \par \par Nuclear stress test 2/19/21: Lexiscan. No evidence of ischemia by EKG. There is a small, mild defect in apical wall that is fixed, suggestive of infarct. Clinical correlation suggested given hx of gastric sleeve and hiatal hernia. Post stress gated wall motion anaylsis; EF 57%, revealing hypokinesis in apical walls.\par \par Echo 2/15/21: EF 62%. Mild MR. Severely dilated LA. Normal wall motion. Mild TR. Mild aortic arch dilatation. Compared with eho 12/17/19, no sig changes noted.\par \par Labs 2/19/21: Gluc 115, Cr 0.72, Na 143, K 3.8, Ca 9.6, ALT 34, AST 34, \par Blood work November 2020 hemoglobin 12.4 total cholesterol 104 LDL 20 \par \par EKG 2/12/21: SB at 58 bpm, possible old inferior infarct, OH interval 170 ms, QTc 429 ms, nonspecific ST-T wave abnormalities \par \par CT scan describes stable exam replacement of ascending aorta no dissection or intramural hematoma \par loop recorder without events \par \par Carotid Doppler. April 2018. Intimal thickening. \par \par

## 2021-07-20 NOTE — ASSESSMENT
[FreeTextEntry1] : DVAY PIRES is a 55 year old F who presents today Jul 20, 2021 here for routine cardiovascular followup. \par \par Recurrent TIAs\par History of DVT/PE status post anticoagulation,  IVC filter\par Since dx of antiphospholipid syndrome by Dr. Oliva and on OAC and ASA there has been no recurrent neuro events. \par Prior DAYANA without PFO. \par ILR 10 months no AF thus far. \par No significant VHD. \par Also sees Dr. Yu Girard [facility] neuro. \par Recommend continued management with hematology and neurology. \par Monitor CBC, reviewed bleeding precautions. \par \par HLD\par Long term benefit on statin rx for prevention reviewed. Significant myalgias on lipitor 20mg. She will stay off statin for 2 weeks drug holiday. Will reassess lipids in order to gauge further therapy. Would likely add crestor 5mg low dose if myalgias resolved in 2 weeks. \par \par Aortic aneurysm s/p repair 7/15\par Family history of aortic disease\par Cont F/U [facility] for ongoing surveillance. \par \par MR, mild\par Normal LVEF\par No CHF\par Surveillance monitoring \par No SBE prophylaxis required. \par \par Cont ongoing remote ILR monitoring. \par Will call with lab results and to discuss statin management. \par Any questions and concerns were addressed and resolved. \par \par Sincerely,\par \par PATRICIA Barragan\par Patients history, testing, and plan reviewed with supervising MD: Dr. Medhat Parra \par \par

## 2021-08-17 ENCOUNTER — NON-APPOINTMENT (OUTPATIENT)
Age: 56
End: 2021-08-17

## 2021-08-17 ENCOUNTER — APPOINTMENT (OUTPATIENT)
Dept: CARDIOLOGY | Facility: CLINIC | Age: 56
End: 2021-08-17
Payer: COMMERCIAL

## 2021-08-17 PROCEDURE — 93298 REM INTERROG DEV EVAL SCRMS: CPT

## 2021-08-17 PROCEDURE — G2066: CPT

## 2021-09-21 ENCOUNTER — NON-APPOINTMENT (OUTPATIENT)
Age: 56
End: 2021-09-21

## 2021-09-21 ENCOUNTER — APPOINTMENT (OUTPATIENT)
Dept: CARDIOLOGY | Facility: CLINIC | Age: 56
End: 2021-09-21
Payer: COMMERCIAL

## 2021-09-21 PROCEDURE — G2066: CPT

## 2021-09-21 PROCEDURE — 93298 REM INTERROG DEV EVAL SCRMS: CPT

## 2021-10-26 ENCOUNTER — NON-APPOINTMENT (OUTPATIENT)
Age: 56
End: 2021-10-26

## 2021-10-26 ENCOUNTER — APPOINTMENT (OUTPATIENT)
Dept: CARDIOLOGY | Facility: CLINIC | Age: 56
End: 2021-10-26
Payer: COMMERCIAL

## 2021-10-26 PROCEDURE — G2066: CPT

## 2021-10-26 PROCEDURE — 93298 REM INTERROG DEV EVAL SCRMS: CPT

## 2021-11-26 ENCOUNTER — NON-APPOINTMENT (OUTPATIENT)
Age: 56
End: 2021-11-26

## 2021-12-01 ENCOUNTER — NON-APPOINTMENT (OUTPATIENT)
Age: 56
End: 2021-12-01

## 2021-12-01 ENCOUNTER — APPOINTMENT (OUTPATIENT)
Dept: CARDIOLOGY | Facility: CLINIC | Age: 56
End: 2021-12-01
Payer: COMMERCIAL

## 2021-12-01 PROCEDURE — 93298 REM INTERROG DEV EVAL SCRMS: CPT | Mod: NC

## 2021-12-01 PROCEDURE — G2066: CPT

## 2022-01-03 ENCOUNTER — NON-APPOINTMENT (OUTPATIENT)
Age: 57
End: 2022-01-03

## 2022-01-04 ENCOUNTER — APPOINTMENT (OUTPATIENT)
Dept: CARDIOLOGY | Facility: CLINIC | Age: 57
End: 2022-01-04
Payer: COMMERCIAL

## 2022-01-04 PROCEDURE — 93298 REM INTERROG DEV EVAL SCRMS: CPT

## 2022-01-04 PROCEDURE — G2066: CPT

## 2022-02-08 ENCOUNTER — NON-APPOINTMENT (OUTPATIENT)
Age: 57
End: 2022-02-08

## 2022-02-08 ENCOUNTER — APPOINTMENT (OUTPATIENT)
Dept: CARDIOLOGY | Facility: CLINIC | Age: 57
End: 2022-02-08
Payer: COMMERCIAL

## 2022-02-08 PROCEDURE — 93298 REM INTERROG DEV EVAL SCRMS: CPT

## 2022-02-08 PROCEDURE — G2066: CPT

## 2022-02-09 ENCOUNTER — OUTPATIENT (OUTPATIENT)
Dept: OUTPATIENT SERVICES | Facility: HOSPITAL | Age: 57
LOS: 1 days | End: 2022-02-09

## 2022-02-09 ENCOUNTER — INPATIENT (INPATIENT)
Facility: HOSPITAL | Age: 57
LOS: 1 days | Discharge: HOME | End: 2022-02-11
Payer: COMMERCIAL

## 2022-02-09 DIAGNOSIS — R59.1 GENERALIZED ENLARGED LYMPH NODES: Chronic | ICD-10-CM

## 2022-02-09 DIAGNOSIS — Z98.89 OTHER SPECIFIED POSTPROCEDURAL STATES: Chronic | ICD-10-CM

## 2022-02-09 DIAGNOSIS — Z98.890 OTHER SPECIFIED POSTPROCEDURAL STATES: Chronic | ICD-10-CM

## 2022-02-09 DIAGNOSIS — Q17.9 CONGENITAL MALFORMATION OF EAR, UNSPECIFIED: Chronic | ICD-10-CM

## 2022-02-09 DIAGNOSIS — Z41.1 ENCOUNTER FOR COSMETIC SURGERY: Chronic | ICD-10-CM

## 2022-02-09 DIAGNOSIS — Z90.711 ACQUIRED ABSENCE OF UTERUS WITH REMAINING CERVICAL STUMP: Chronic | ICD-10-CM

## 2022-02-09 DIAGNOSIS — Z90.13 ACQUIRED ABSENCE OF BILATERAL BREASTS AND NIPPLES: Chronic | ICD-10-CM

## 2022-02-09 PROCEDURE — 93010 ELECTROCARDIOGRAM REPORT: CPT

## 2022-02-09 PROCEDURE — 99285 EMERGENCY DEPT VISIT HI MDM: CPT

## 2022-02-10 PROCEDURE — 76770 US EXAM ABDO BACK WALL COMP: CPT | Mod: 26

## 2022-02-11 ENCOUNTER — OUTPATIENT (OUTPATIENT)
Dept: OUTPATIENT SERVICES | Facility: HOSPITAL | Age: 57
LOS: 1 days | End: 2022-02-11

## 2022-02-11 DIAGNOSIS — Z98.89 OTHER SPECIFIED POSTPROCEDURAL STATES: Chronic | ICD-10-CM

## 2022-02-11 DIAGNOSIS — Q17.9 CONGENITAL MALFORMATION OF EAR, UNSPECIFIED: Chronic | ICD-10-CM

## 2022-02-11 DIAGNOSIS — Z90.13 ACQUIRED ABSENCE OF BILATERAL BREASTS AND NIPPLES: Chronic | ICD-10-CM

## 2022-02-11 DIAGNOSIS — R59.1 GENERALIZED ENLARGED LYMPH NODES: Chronic | ICD-10-CM

## 2022-02-11 DIAGNOSIS — Z41.1 ENCOUNTER FOR COSMETIC SURGERY: Chronic | ICD-10-CM

## 2022-02-11 DIAGNOSIS — Z90.711 ACQUIRED ABSENCE OF UTERUS WITH REMAINING CERVICAL STUMP: Chronic | ICD-10-CM

## 2022-02-11 DIAGNOSIS — Z98.890 OTHER SPECIFIED POSTPROCEDURAL STATES: Chronic | ICD-10-CM

## 2022-02-11 PROCEDURE — 93976 VASCULAR STUDY: CPT | Mod: 26

## 2022-02-11 PROCEDURE — 76705 ECHO EXAM OF ABDOMEN: CPT | Mod: 26,59

## 2022-02-11 PROCEDURE — 88189 FLOWCYTOMETRY/READ 16 & >: CPT

## 2022-02-16 DIAGNOSIS — D68.51 ACTIVATED PROTEIN C RESISTANCE: ICD-10-CM

## 2022-02-16 DIAGNOSIS — Z86.718 PERSONAL HISTORY OF OTHER VENOUS THROMBOSIS AND EMBOLISM: ICD-10-CM

## 2022-02-16 DIAGNOSIS — Z86.73 PERSONAL HISTORY OF TRANSIENT ISCHEMIC ATTACK (TIA), AND CEREBRAL INFARCTION WITHOUT RESIDUAL DEFICITS: ICD-10-CM

## 2022-02-16 DIAGNOSIS — Z20.822 CONTACT WITH AND (SUSPECTED) EXPOSURE TO COVID-19: ICD-10-CM

## 2022-02-16 DIAGNOSIS — Z85.3 PERSONAL HISTORY OF MALIGNANT NEOPLASM OF BREAST: ICD-10-CM

## 2022-02-16 DIAGNOSIS — I82.0 BUDD-CHIARI SYNDROME: ICD-10-CM

## 2022-02-16 DIAGNOSIS — Z86.711 PERSONAL HISTORY OF PULMONARY EMBOLISM: ICD-10-CM

## 2022-02-16 DIAGNOSIS — D68.61 ANTIPHOSPHOLIPID SYNDROME: ICD-10-CM

## 2022-02-16 DIAGNOSIS — N39.0 URINARY TRACT INFECTION, SITE NOT SPECIFIED: ICD-10-CM

## 2022-02-16 DIAGNOSIS — F32.9 MAJOR DEPRESSIVE DISORDER, SINGLE EPISODE, UNSPECIFIED: ICD-10-CM

## 2022-02-16 DIAGNOSIS — R00.1 BRADYCARDIA, UNSPECIFIED: ICD-10-CM

## 2022-02-16 DIAGNOSIS — Z98.84 BARIATRIC SURGERY STATUS: ICD-10-CM

## 2022-03-10 DIAGNOSIS — D68.61 ANTIPHOSPHOLIPID SYNDROME: ICD-10-CM

## 2022-03-10 DIAGNOSIS — I82.0 BUDD-CHIARI SYNDROME: ICD-10-CM

## 2022-03-10 DIAGNOSIS — N39.0 URINARY TRACT INFECTION, SITE NOT SPECIFIED: ICD-10-CM

## 2022-03-12 DIAGNOSIS — I82.0 BUDD-CHIARI SYNDROME: ICD-10-CM

## 2022-03-12 DIAGNOSIS — N39.0 URINARY TRACT INFECTION, SITE NOT SPECIFIED: ICD-10-CM

## 2022-03-12 DIAGNOSIS — D68.61 ANTIPHOSPHOLIPID SYNDROME: ICD-10-CM

## 2022-03-15 ENCOUNTER — NON-APPOINTMENT (OUTPATIENT)
Age: 57
End: 2022-03-15

## 2022-03-15 ENCOUNTER — APPOINTMENT (OUTPATIENT)
Dept: CARDIOLOGY | Facility: CLINIC | Age: 57
End: 2022-03-15
Payer: COMMERCIAL

## 2022-03-15 PROCEDURE — G2066: CPT

## 2022-03-15 PROCEDURE — 93298 REM INTERROG DEV EVAL SCRMS: CPT

## 2022-04-19 ENCOUNTER — NON-APPOINTMENT (OUTPATIENT)
Age: 57
End: 2022-04-19

## 2022-04-19 ENCOUNTER — APPOINTMENT (OUTPATIENT)
Dept: CARDIOLOGY | Facility: CLINIC | Age: 57
End: 2022-04-19
Payer: COMMERCIAL

## 2022-04-19 PROCEDURE — 93298 REM INTERROG DEV EVAL SCRMS: CPT

## 2022-04-19 PROCEDURE — G2066: CPT

## 2022-05-02 ENCOUNTER — OUTPATIENT (OUTPATIENT)
Dept: OUTPATIENT SERVICES | Facility: HOSPITAL | Age: 57
LOS: 1 days | End: 2022-05-02
Payer: COMMERCIAL

## 2022-05-02 DIAGNOSIS — Z41.1 ENCOUNTER FOR COSMETIC SURGERY: Chronic | ICD-10-CM

## 2022-05-02 DIAGNOSIS — Z90.13 ACQUIRED ABSENCE OF BILATERAL BREASTS AND NIPPLES: Chronic | ICD-10-CM

## 2022-05-02 DIAGNOSIS — Z98.89 OTHER SPECIFIED POSTPROCEDURAL STATES: Chronic | ICD-10-CM

## 2022-05-02 DIAGNOSIS — R59.1 GENERALIZED ENLARGED LYMPH NODES: Chronic | ICD-10-CM

## 2022-05-02 DIAGNOSIS — Z98.890 OTHER SPECIFIED POSTPROCEDURAL STATES: Chronic | ICD-10-CM

## 2022-05-02 DIAGNOSIS — D68.0 VON WILLEBRAND DISEASE: ICD-10-CM

## 2022-05-02 DIAGNOSIS — Z90.711 ACQUIRED ABSENCE OF UTERUS WITH REMAINING CERVICAL STUMP: Chronic | ICD-10-CM

## 2022-05-02 DIAGNOSIS — Q17.9 CONGENITAL MALFORMATION OF EAR, UNSPECIFIED: Chronic | ICD-10-CM

## 2022-05-03 ENCOUNTER — LABORATORY RESULT (OUTPATIENT)
Age: 57
End: 2022-05-03

## 2022-05-03 ENCOUNTER — RESULT REVIEW (OUTPATIENT)
Age: 57
End: 2022-05-03

## 2022-05-03 ENCOUNTER — APPOINTMENT (OUTPATIENT)
Dept: HEMATOLOGY ONCOLOGY | Facility: CLINIC | Age: 57
End: 2022-05-03
Payer: COMMERCIAL

## 2022-05-03 VITALS
DIASTOLIC BLOOD PRESSURE: 87 MMHG | TEMPERATURE: 97.1 F | HEART RATE: 51 BPM | WEIGHT: 179.4 LBS | SYSTOLIC BLOOD PRESSURE: 133 MMHG | BODY MASS INDEX: 33.01 KG/M2 | HEIGHT: 62 IN | OXYGEN SATURATION: 98 %

## 2022-05-03 LAB
BASOPHILS # BLD AUTO: 0.06 K/UL — SIGNIFICANT CHANGE UP (ref 0–0.2)
BASOPHILS NFR BLD AUTO: 1 % — SIGNIFICANT CHANGE UP (ref 0–2)
EOSINOPHIL # BLD AUTO: 0.15 K/UL — SIGNIFICANT CHANGE UP (ref 0–0.5)
EOSINOPHIL NFR BLD AUTO: 2.4 % — SIGNIFICANT CHANGE UP (ref 0–6)
HCT VFR BLD CALC: 38.6 % — SIGNIFICANT CHANGE UP (ref 34.5–45)
HGB BLD-MCNC: 12.8 G/DL — SIGNIFICANT CHANGE UP (ref 11.5–15.5)
IMM GRANULOCYTES NFR BLD AUTO: 0.3 % — SIGNIFICANT CHANGE UP (ref 0–1.5)
LYMPHOCYTES # BLD AUTO: 2.12 K/UL — SIGNIFICANT CHANGE UP (ref 1–3.3)
LYMPHOCYTES # BLD AUTO: 33.7 % — SIGNIFICANT CHANGE UP (ref 13–44)
MCHC RBC-ENTMCNC: 30.2 PG — SIGNIFICANT CHANGE UP (ref 27–34)
MCHC RBC-ENTMCNC: 33.2 GM/DL — SIGNIFICANT CHANGE UP (ref 32–36)
MCV RBC AUTO: 91 FL — SIGNIFICANT CHANGE UP (ref 80–100)
MONOCYTES # BLD AUTO: 0.32 K/UL — SIGNIFICANT CHANGE UP (ref 0–0.9)
MONOCYTES NFR BLD AUTO: 5.1 % — SIGNIFICANT CHANGE UP (ref 2–14)
NEUTROPHILS # BLD AUTO: 3.62 K/UL — SIGNIFICANT CHANGE UP (ref 1.8–7.4)
NEUTROPHILS NFR BLD AUTO: 57.5 % — SIGNIFICANT CHANGE UP (ref 43–77)
NRBC # BLD: 0 /100 WBCS — SIGNIFICANT CHANGE UP (ref 0–0)
PLATELET # BLD AUTO: 187 K/UL — SIGNIFICANT CHANGE UP (ref 150–400)
RBC # BLD: 4.24 M/UL — SIGNIFICANT CHANGE UP (ref 3.8–5.2)
RBC # FLD: 15.1 % — HIGH (ref 10.3–14.5)
WBC # BLD: 6.29 K/UL — SIGNIFICANT CHANGE UP (ref 3.8–10.5)
WBC # FLD AUTO: 6.29 K/UL — SIGNIFICANT CHANGE UP (ref 3.8–10.5)

## 2022-05-03 PROCEDURE — 99205 OFFICE O/P NEW HI 60 MIN: CPT

## 2022-05-04 ENCOUNTER — NON-APPOINTMENT (OUTPATIENT)
Age: 57
End: 2022-05-04

## 2022-05-04 LAB
ALBUMIN SERPL ELPH-MCNC: 4.5 G/DL
ALP BLD-CCNC: 98 U/L
ALT SERPL-CCNC: 14 U/L
ANION GAP SERPL CALC-SCNC: 13 MMOL/L
APTT BLD: 44.9 SEC
APTT HEX PL PPP: NEGATIVE
AST SERPL-CCNC: 22 U/L
B2 GLYCOPROT1 AB SER QL: POSITIVE
BILIRUB SERPL-MCNC: 0.3 MG/DL
BUN SERPL-MCNC: 12 MG/DL
CALCIUM SERPL-MCNC: 9.4 MG/DL
CARDIOLIPIN AB SER IA-ACNC: POSITIVE
CHLORIDE SERPL-SCNC: 105 MMOL/L
CO2 SERPL-SCNC: 23 MMOL/L
CREAT SERPL-MCNC: 0.75 MG/DL
DEPRECATED D DIMER PPP IA-ACNC: <150 NG/ML DDU
EGFR: 93 ML/MIN/1.73M2
FERRITIN SERPL-MCNC: 557 NG/ML
FOLATE SERPL-MCNC: >20 NG/ML
GLUCOSE SERPL-MCNC: 84 MG/DL
HEX-1: 42.4 SECS
HEX-2: 43.3 SECS
INR PPP: 1.66 RATIO
IRON SATN MFR SERPL: 19 %
IRON SERPL-MCNC: 54 UG/DL
POTASSIUM SERPL-SCNC: 4.1 MMOL/L
PROT SERPL-MCNC: 6.6 G/DL
PT BLD: 19.4 SEC
SODIUM SERPL-SCNC: 141 MMOL/L
TIBC SERPL-MCNC: 289 UG/DL
UIBC SERPL-MCNC: 235 UG/DL
VIT B12 SERPL-MCNC: 1829 PG/ML

## 2022-05-04 NOTE — REASON FOR VISIT
[Initial Consultation] : an initial consultation for [FreeTextEntry2] : APLS, factor V leiden heterozygosity, history of PE

## 2022-05-04 NOTE — RESULTS/DATA
[FreeTextEntry1] : Referred by: Dr. Medhat Watkins \par Cardiology: Dr. Amarjit Easton \par [facility]\par \par Ms. Martin presented at age 56 in May 2022 for evaluation of APLS, history of PE. \par The patient has a medical history of bilateral pleomorphic LCIS s/p bilateral mastectomy 2012 (Dr. Karson Richardson, Dr. Mercedes Cuello), FVL heterozygosity, hereditary hemochromatosis heterozygosity, TIA x 3 (2017, 2018, 2021), AAA s/p repair, DVT/PE s/p IVC filter, now on coumadin. \par \par She has multiple risk factors for recurrent clot and should remain on anticoagulation at this time.  However, she is not being adequately anticoagulated on Coumadin and is having complications including hematuria.  At this time I recommend that we switch to Eliquis 5 mg twice daily and restart her aspirin for history of TIA.\par I will touch base with her cardiologist as well.\par We will repeat APLS testing at this time.

## 2022-05-04 NOTE — PHYSICAL EXAM
[Restricted in physically strenuous activity but ambulatory and able to carry out work of a light or sedentary nature] : Status 1- Restricted in physically strenuous activity but ambulatory and able to carry out work of a light or sedentary nature, e.g., light house work, office work [Normal] : affect appropriate [de-identified] : generally well appearing female, NAD  [de-identified] : s/p sternotomy, keloids present at surgical site  [de-identified] : s/p bilateral mastectomy for LCIS, small nodule in R breast 1-2mm, no palpable LAD

## 2022-05-04 NOTE — CONSULT LETTER
[Dear  ___] : Dear  [unfilled], [Consult Letter:] : I had the pleasure of evaluating your patient, [unfilled]. [Please see my note below.] : Please see my note below. [Consult Closing:] : Thank you very much for allowing me to participate in the care of this patient.  If you have any questions, please do not hesitate to contact me. [Sincerely,] : Sincerely, [FreeTextEntry2] : Dr. Medhat Watkins  [FreeTextEntry3] : Dr. Lili Bustamante\par

## 2022-05-04 NOTE — HISTORY OF PRESENT ILLNESS
[de-identified] : Referred by: Dr. Medhat Watkins \par Previously followed by [facility] Dr. Oliva\par \par Ms. Mratin presented at age 56 in May 2022 for evaluation of APLS, history of PE. \par The patient has a medical history of bilateral pleomorphic LCIS s/p bilateral mastectomy  (Dr. Karson Richardson, Dr. Mercedes Cuello), FVL heterozygosity, hereditary hemochromatosis heterozygosity, TIA x 3 (, , ), AAA s/p repair, DVT/PE s/p IVC filter, now on coumadin. \par \par Estefania is here to establish care for history of pulmonary embolism ( s/p surgical procedure and travelling), and possible antiphospholipid syndrome.  She has previously been following with Dr. Oliva from [facility] for history of factor V Leiden heterozygosity, hereditary hemochromatosis (heterozygosity C282Y), history of TIA, on aspirin, history of DVT/PE.  Following her diagnosis of pulmonary embolism she was initiated on Xarelto which she was tolerating well.  She subsequently developed abdominal pain and was found to have a blood clot in the hepatic vein on imaging and her anticoagulation was switched to Coumadin given concern for failure that area of Xarelto as well as possible antiphospholipid syndrome given persistently elevated IgM anticardiolipin antibody.  In retrospect her imaging was reviewed by Dr. Caro and she did not have a blood clot in the hepatic vein.\par \par Since she has been on Coumadin she has had difficulty maintaining a therapeutic INR.  She is currently taking 6 mg daily.  She states she is not feeling well and has become more anemic- required iron infusion recently. She also reports that she has been having episodes of hematuria and is being worked up for possible kidney stones.  She saw Dr. Red from urology and cystoscopy was performed which was normal.  She has had multiple urine cultures which were negative for infection. Her plavix has been discontinued. She also stopped her ASA last Thursday 2/2 hematuria which has resolved. Seen yesterday for INR check at [facility] - INR 1.8. \par \par Of note, she has a history of bilateral LCIS, s/p bilateral mastectomy. Genetic testing was negative. \par \par Laboratory studies from 5/3/22 reviewed and notable for: INR 1.66\par D-dimer <150, iron studies sufficient, B12/folate sufficient, ferritin 557, WBC 6.29, Hb 12.8, Plt 187\par \par HCM: \par - COVID vaccination: s/p 3 doses \par - Colonoscopy: Colonoscopy with Dr. Sherman- reportedly unremarkable in 2022 \par - Gyn: Pap 2022, normal, hysterectomy removed in 2017 for fibroids, still has ovaries, fallopian tubes and cervix \par - Mammo: no longer screening, s/p bilateral mastectomy \par \par SH: \par - Occupation: works for [employer] as a  \par - Living situation: lives in [place] with her \par - Smoking/etoh/illicits: former smoker, quit at age 25, rare etoh, denies illicits \par - Exercise: not very physically active, previously played ice hockey \par \par FH: \par - Her brother  of pancreatic cancer at age 55\par - Her mother  of breast cancer at age 62, diagnosed at age 55\par - Her sister was diagnosed with DCIS at age 54\par - Father and pAunt had hereditary hemochromatosis \par - Father  of liver cancer at age 72\par \par Eliquis 5mg BID - [facility] [phone]

## 2022-05-05 DIAGNOSIS — Z79.01 ENCOUNTER FOR THERAPEUTIC DRUG LVL MONITORING: ICD-10-CM

## 2022-05-05 DIAGNOSIS — Z51.81 ENCOUNTER FOR THERAPEUTIC DRUG LVL MONITORING: ICD-10-CM

## 2022-05-10 ENCOUNTER — APPOINTMENT (OUTPATIENT)
Dept: HEMATOLOGY ONCOLOGY | Facility: CLINIC | Age: 57
End: 2022-05-10

## 2022-05-17 ENCOUNTER — RESULT REVIEW (OUTPATIENT)
Age: 57
End: 2022-05-17

## 2022-05-17 ENCOUNTER — APPOINTMENT (OUTPATIENT)
Dept: HEMATOLOGY ONCOLOGY | Facility: CLINIC | Age: 57
End: 2022-05-17
Payer: COMMERCIAL

## 2022-05-17 VITALS
BODY MASS INDEX: 32.92 KG/M2 | TEMPERATURE: 98.1 F | WEIGHT: 180 LBS | HEART RATE: 60 BPM | DIASTOLIC BLOOD PRESSURE: 97 MMHG | OXYGEN SATURATION: 97 % | SYSTOLIC BLOOD PRESSURE: 148 MMHG

## 2022-05-17 DIAGNOSIS — N63.12 UNSPECIFIED LUMP IN THE RIGHT BREAST, UPPER INNER QUADRANT: ICD-10-CM

## 2022-05-17 DIAGNOSIS — I26.99 OTHER PULMONARY EMBOLISM W/OUT ACUTE COR PULMONALE: ICD-10-CM

## 2022-05-17 LAB
APTT BLD: 38.3 SEC
BASOPHILS # BLD AUTO: 0.05 K/UL — SIGNIFICANT CHANGE UP (ref 0–0.2)
BASOPHILS NFR BLD AUTO: 0.8 % — SIGNIFICANT CHANGE UP (ref 0–2)
EOSINOPHIL # BLD AUTO: 0.13 K/UL — SIGNIFICANT CHANGE UP (ref 0–0.5)
EOSINOPHIL NFR BLD AUTO: 2 % — SIGNIFICANT CHANGE UP (ref 0–6)
HCT VFR BLD CALC: 37.7 % — SIGNIFICANT CHANGE UP (ref 34.5–45)
HGB BLD-MCNC: 12.9 G/DL — SIGNIFICANT CHANGE UP (ref 11.5–15.5)
IMM GRANULOCYTES NFR BLD AUTO: 0.2 % — SIGNIFICANT CHANGE UP (ref 0–1.5)
INR PPP: 1.2 RATIO
LYMPHOCYTES # BLD AUTO: 1.85 K/UL — SIGNIFICANT CHANGE UP (ref 1–3.3)
LYMPHOCYTES # BLD AUTO: 27.8 % — SIGNIFICANT CHANGE UP (ref 13–44)
MCHC RBC-ENTMCNC: 31.5 PG — SIGNIFICANT CHANGE UP (ref 27–34)
MCHC RBC-ENTMCNC: 34.2 GM/DL — SIGNIFICANT CHANGE UP (ref 32–36)
MCV RBC AUTO: 92 FL — SIGNIFICANT CHANGE UP (ref 80–100)
MONOCYTES # BLD AUTO: 0.43 K/UL — SIGNIFICANT CHANGE UP (ref 0–0.9)
MONOCYTES NFR BLD AUTO: 6.5 % — SIGNIFICANT CHANGE UP (ref 2–14)
NEUTROPHILS # BLD AUTO: 4.18 K/UL — SIGNIFICANT CHANGE UP (ref 1.8–7.4)
NEUTROPHILS NFR BLD AUTO: 62.7 % — SIGNIFICANT CHANGE UP (ref 43–77)
NRBC # BLD: 0 /100 WBCS — SIGNIFICANT CHANGE UP (ref 0–0)
PLATELET # BLD AUTO: 163 K/UL — SIGNIFICANT CHANGE UP (ref 150–400)
PT BLD: 14.2 SEC
RBC # BLD: 4.1 M/UL — SIGNIFICANT CHANGE UP (ref 3.8–5.2)
RBC # FLD: 14.9 % — HIGH (ref 10.3–14.5)
WBC # BLD: 6.65 K/UL — SIGNIFICANT CHANGE UP (ref 3.8–10.5)
WBC # FLD AUTO: 6.65 K/UL — SIGNIFICANT CHANGE UP (ref 3.8–10.5)

## 2022-05-17 PROCEDURE — 99214 OFFICE O/P EST MOD 30 MIN: CPT

## 2022-05-17 PROCEDURE — 36415 COLL VENOUS BLD VENIPUNCTURE: CPT

## 2022-05-17 PROCEDURE — 85027 COMPLETE CBC AUTOMATED: CPT

## 2022-05-17 NOTE — PHYSICAL EXAM
[Restricted in physically strenuous activity but ambulatory and able to carry out work of a light or sedentary nature] : Status 1- Restricted in physically strenuous activity but ambulatory and able to carry out work of a light or sedentary nature, e.g., light house work, office work [Normal] : affect appropriate [de-identified] : generally well appearing female, NAD  [de-identified] : s/p sternotomy, keloids present at surgical site  [de-identified] : s/p bilateral mastectomy for LCIS, small nodule in R breast 1-2mm, no palpable LAD

## 2022-05-17 NOTE — HISTORY OF PRESENT ILLNESS
[de-identified] : Referred by: Dr. Medhat Watkins \par Previously followed by [facility] Dr. Oliva\par \par Ms. Martin presented at age 56 in May 2022 for evaluation of APLS, history of PE. \par The patient has a medical history of bilateral pleomorphic LCIS s/p bilateral mastectomy  (Dr. Karson Richardson, Dr. Mercedes Cuello), FVL heterozygosity, hereditary hemochromatosis heterozygosity, TIA x 3 (, , ), AAA s/p repair, DVT/PE s/p IVC filter, now on coumadin. \par \par Presenting HPI: Estefania is here to establish care for history of pulmonary embolism ( s/p surgical procedure and travelling), and possible antiphospholipid syndrome.  She has previously been following with Dr. Oliva from [facility] for history of factor V Leiden heterozygosity, hereditary hemochromatosis (heterozygosity C282Y), history of TIA, on aspirin, history of DVT/PE.  Following her diagnosis of pulmonary embolism she was initiated on Xarelto which she was tolerating well.  She subsequently developed abdominal pain and was found to have a blood clot in the hepatic vein on imaging and her anticoagulation was switched to Coumadin given concern for failure that area of Xarelto as well as possible antiphospholipid syndrome given persistently elevated IgM anticardiolipin antibody.  In retrospect her imaging was reviewed by Dr. Caro and she did not have a blood clot in the hepatic vein.\par \par Since she has been on Coumadin she has had difficulty maintaining a therapeutic INR.  She is currently taking 6 mg daily.  She states she is not feeling well and has become more anemic- required iron infusion recently. She also reports that she has been having episodes of hematuria and is being worked up for possible kidney stones.  She saw Dr. Red from urology and cystoscopy was performed which was normal.  She has had multiple urine cultures which were negative for infection. Her plavix has been discontinued. She also stopped her ASA last Thursday 2/2 hematuria which has resolved. Seen yesterday for INR check at [facility] - INR 1.8. \par \par Of note, she has a history of bilateral LCIS, s/p bilateral mastectomy. Genetic testing was negative. \par \par Laboratory studies from 5/3/22 reviewed and notable for: INR 1.66\par D-dimer <150, iron studies sufficient, B12/folate sufficient, ferritin 557, WBC 6.29, Hb 12.8, Plt 187\par \par HCM: \par - COVID vaccination: s/p 3 doses \par - Colonoscopy: Colonoscopy with Dr. Sherman- reportedly unremarkable in 2022 \par - Gyn: Pap 2022, normal, hysterectomy removed in 2017 for fibroids, still has ovaries, fallopian tubes and cervix \par - Mammo: no longer screening, s/p bilateral mastectomy \par \par SH: \par - Occupation: works for [employer] as a  \par - Living situation: lives in [place] with her \par - Smoking/etoh/illicits: former smoker, quit at age 25, rare etoh, denies illicits \par - Exercise: not very physically active, previously played ice hockey \par \par FH: \par - Her brother  of pancreatic cancer at age 55\par - Her mother  of breast cancer at age 62, diagnosed at age 55\par - Her sister was diagnosed with DCIS at age 54\par - Father and pAunt had hereditary hemochromatosis \par - Father  of liver cancer at age 72\par \par Eliquis 5mg BID - [facility] [phone] [de-identified] : Estefania is here for follow up on 5/17/22 for hypercoagulable state. \par \par Hexogonal lupus assay negative, lupus AC negative \par B2G screen positive, Cardiolipin Ab positive, Anticardiolipin Ab IgG neg, IgM + 36.2 (H), IgA neg, B2G negative \par Low risk APLS- only positive for anticardiolipin IgM. Lupus AC and B2G negative. \par \par At today's visit she reports that she is feeling much better.  She is no longer having hematuria.  She is very happy on Eliquis and denies any bleeding issues.  She reports a good energy level and appetite.  Denies fevers, chills, chest pain, shortness of breath, nausea, vomiting, diarrhea.

## 2022-05-17 NOTE — CONSULT LETTER
[Dear  ___] : Dear  [unfilled], [Consult Letter:] : I had the pleasure of evaluating your patient, [unfilled]. [Please see my note below.] : Please see my note below. [Consult Closing:] : Thank you very much for allowing me to participate in the care of this patient.  If you have any questions, please do not hesitate to contact me. [Sincerely,] : Sincerely, [FreeTextEntry2] : Dr. Amarjit Easton  [FreeTextEntry3] : Dr. Lili Bustamante\par

## 2022-05-17 NOTE — RESULTS/DATA
[FreeTextEntry1] : Referred by: Dr. Medhat Watkins \par Cardiology: Dr. Amarjit Easton \par [facility]\par \par Ms. Martin presented at age 56 in May 2022 for evaluation of APLS, history of PE. \par The patient has a medical history of bilateral pleomorphic LCIS s/p bilateral mastectomy 2012 (Dr. Karson Richardson, Dr. Mercedes Cuello), FVL heterozygosity, hereditary hemochromatosis heterozygosity, TIA x 3 (2017, 2018, 2021), AAA s/p repair, DVT/PE s/p IVC filter, now on coumadin. \par \par She has multiple risk factors for recurrent clot and should remain on anticoagulation at this time.  She has not been adequately anticoagulated on coumadin- lab studies revealed low risk APLS. Switched to eliquis for more stable anticoagulation at this time. Risks/benefits discussed with the patient in detail. Continue ASA. \par Follow up R breast US.

## 2022-05-24 ENCOUNTER — APPOINTMENT (OUTPATIENT)
Dept: CARDIOLOGY | Facility: CLINIC | Age: 57
End: 2022-05-24
Payer: COMMERCIAL

## 2022-05-24 ENCOUNTER — NON-APPOINTMENT (OUTPATIENT)
Age: 57
End: 2022-05-24

## 2022-05-24 PROCEDURE — G2066: CPT

## 2022-05-24 PROCEDURE — 93298 REM INTERROG DEV EVAL SCRMS: CPT

## 2022-06-28 ENCOUNTER — APPOINTMENT (OUTPATIENT)
Dept: CARDIOLOGY | Facility: CLINIC | Age: 57
End: 2022-06-28

## 2022-06-28 ENCOUNTER — NON-APPOINTMENT (OUTPATIENT)
Age: 57
End: 2022-06-28

## 2022-06-28 PROCEDURE — G2066: CPT

## 2022-06-28 PROCEDURE — 93298 REM INTERROG DEV EVAL SCRMS: CPT

## 2022-07-13 ENCOUNTER — OUTPATIENT (OUTPATIENT)
Dept: OUTPATIENT SERVICES | Facility: HOSPITAL | Age: 57
LOS: 1 days | End: 2022-07-13
Payer: COMMERCIAL

## 2022-07-13 DIAGNOSIS — Z41.1 ENCOUNTER FOR COSMETIC SURGERY: Chronic | ICD-10-CM

## 2022-07-13 DIAGNOSIS — Z98.89 OTHER SPECIFIED POSTPROCEDURAL STATES: Chronic | ICD-10-CM

## 2022-07-13 DIAGNOSIS — Z90.711 ACQUIRED ABSENCE OF UTERUS WITH REMAINING CERVICAL STUMP: Chronic | ICD-10-CM

## 2022-07-13 DIAGNOSIS — Z98.890 OTHER SPECIFIED POSTPROCEDURAL STATES: Chronic | ICD-10-CM

## 2022-07-13 DIAGNOSIS — Z90.13 ACQUIRED ABSENCE OF BILATERAL BREASTS AND NIPPLES: Chronic | ICD-10-CM

## 2022-07-13 DIAGNOSIS — R59.1 GENERALIZED ENLARGED LYMPH NODES: Chronic | ICD-10-CM

## 2022-07-13 DIAGNOSIS — Q17.9 CONGENITAL MALFORMATION OF EAR, UNSPECIFIED: Chronic | ICD-10-CM

## 2022-07-13 DIAGNOSIS — D68.0 VON WILLEBRAND DISEASE: ICD-10-CM

## 2022-07-14 ENCOUNTER — RESULT REVIEW (OUTPATIENT)
Age: 57
End: 2022-07-14

## 2022-07-14 ENCOUNTER — APPOINTMENT (OUTPATIENT)
Dept: HEMATOLOGY ONCOLOGY | Facility: CLINIC | Age: 57
End: 2022-07-14

## 2022-07-14 VITALS
WEIGHT: 180 LBS | BODY MASS INDEX: 32.92 KG/M2 | TEMPERATURE: 97.3 F | DIASTOLIC BLOOD PRESSURE: 84 MMHG | HEART RATE: 55 BPM | SYSTOLIC BLOOD PRESSURE: 135 MMHG | OXYGEN SATURATION: 96 %

## 2022-07-14 LAB
BASOPHILS # BLD AUTO: 0.05 K/UL — SIGNIFICANT CHANGE UP (ref 0–0.2)
BASOPHILS NFR BLD AUTO: 0.8 % — SIGNIFICANT CHANGE UP (ref 0–2)
EOSINOPHIL # BLD AUTO: 0.16 K/UL — SIGNIFICANT CHANGE UP (ref 0–0.5)
EOSINOPHIL NFR BLD AUTO: 2.4 % — SIGNIFICANT CHANGE UP (ref 0–6)
HCT VFR BLD CALC: 39.3 % — SIGNIFICANT CHANGE UP (ref 34.5–45)
HGB BLD-MCNC: 13.4 G/DL — SIGNIFICANT CHANGE UP (ref 11.5–15.5)
IMM GRANULOCYTES NFR BLD AUTO: 0.2 % — SIGNIFICANT CHANGE UP (ref 0–1.5)
LYMPHOCYTES # BLD AUTO: 1.9 K/UL — SIGNIFICANT CHANGE UP (ref 1–3.3)
LYMPHOCYTES # BLD AUTO: 28.7 % — SIGNIFICANT CHANGE UP (ref 13–44)
MCHC RBC-ENTMCNC: 31.8 PG — SIGNIFICANT CHANGE UP (ref 27–34)
MCHC RBC-ENTMCNC: 34.1 GM/DL — SIGNIFICANT CHANGE UP (ref 32–36)
MCV RBC AUTO: 93.3 FL — SIGNIFICANT CHANGE UP (ref 80–100)
MONOCYTES # BLD AUTO: 0.4 K/UL — SIGNIFICANT CHANGE UP (ref 0–0.9)
MONOCYTES NFR BLD AUTO: 6.1 % — SIGNIFICANT CHANGE UP (ref 2–14)
NEUTROPHILS # BLD AUTO: 4.09 K/UL — SIGNIFICANT CHANGE UP (ref 1.8–7.4)
NEUTROPHILS NFR BLD AUTO: 61.8 % — SIGNIFICANT CHANGE UP (ref 43–77)
NRBC # BLD: 0 /100 WBCS — SIGNIFICANT CHANGE UP (ref 0–0)
PLATELET # BLD AUTO: 171 K/UL — SIGNIFICANT CHANGE UP (ref 150–400)
RBC # BLD: 4.21 M/UL — SIGNIFICANT CHANGE UP (ref 3.8–5.2)
RBC # FLD: 13.8 % — SIGNIFICANT CHANGE UP (ref 10.3–14.5)
WBC # BLD: 6.61 K/UL — SIGNIFICANT CHANGE UP (ref 3.8–10.5)
WBC # FLD AUTO: 6.61 K/UL — SIGNIFICANT CHANGE UP (ref 3.8–10.5)

## 2022-07-14 PROCEDURE — 99214 OFFICE O/P EST MOD 30 MIN: CPT

## 2022-07-14 PROCEDURE — 85027 COMPLETE CBC AUTOMATED: CPT

## 2022-07-15 LAB
ALBUMIN SERPL ELPH-MCNC: 4.7 G/DL
ALP BLD-CCNC: 87 U/L
ALT SERPL-CCNC: 20 U/L
ANION GAP SERPL CALC-SCNC: 14 MMOL/L
AST SERPL-CCNC: 23 U/L
BILIRUB SERPL-MCNC: 0.4 MG/DL
BUN SERPL-MCNC: 16 MG/DL
CALCIUM SERPL-MCNC: 9.6 MG/DL
CHLORIDE SERPL-SCNC: 106 MMOL/L
CO2 SERPL-SCNC: 21 MMOL/L
CREAT SERPL-MCNC: 0.77 MG/DL
DEPRECATED D DIMER PPP IA-ACNC: 179 NG/ML DDU
EGFR: 90 ML/MIN/1.73M2
FERRITIN SERPL-MCNC: 274 NG/ML
GLUCOSE SERPL-MCNC: 121 MG/DL
POTASSIUM SERPL-SCNC: 4 MMOL/L
PROT SERPL-MCNC: 7 G/DL
SODIUM SERPL-SCNC: 141 MMOL/L

## 2022-07-15 NOTE — HISTORY OF PRESENT ILLNESS
[de-identified] : Referred by: Dr. Medhat Watkins \par Previously followed by [facility] Dr. Oliva\par \par Ms. Martin presented at age 56 in May 2022 for evaluation of APLS, history of PE. \par The patient has a medical history of bilateral pleomorphic LCIS s/p bilateral mastectomy  (Dr. Karson Richardson, Dr. Mercedes Cuello), FVL heterozygosity, hereditary hemochromatosis heterozygosity, TIA x 3 (, , ), AAA s/p repair, DVT/PE s/p IVC filter, now on coumadin. \par \par Presenting HPI: Estefania is here to establish care for history of pulmonary embolism ( s/p surgical procedure and travelling), and possible antiphospholipid syndrome.  She has previously been following with Dr. Oliva from [facility] for history of factor V Leiden heterozygosity, hereditary hemochromatosis (heterozygosity C282Y), history of TIA, on aspirin, history of DVT/PE.  Following her diagnosis of pulmonary embolism she was initiated on Xarelto which she was tolerating well.  She subsequently developed abdominal pain and was found to have a blood clot in the hepatic vein on imaging and her anticoagulation was switched to Coumadin given concern for failure that area of Xarelto as well as possible antiphospholipid syndrome given persistently elevated IgM anticardiolipin antibody.  In retrospect her imaging was reviewed by Dr. Caro and she did not have a blood clot in the hepatic vein.\par \par Since she has been on Coumadin she has had difficulty maintaining a therapeutic INR.  She is currently taking 6 mg daily.  She states she is not feeling well and has become more anemic- required iron infusion recently. She also reports that she has been having episodes of hematuria and is being worked up for possible kidney stones.  She saw Dr. Red from urology and cystoscopy was performed which was normal.  She has had multiple urine cultures which were negative for infection. Her plavix has been discontinued. She also stopped her ASA last Thursday 2/2 hematuria which has resolved. Seen yesterday for INR check at [facility] - INR 1.8. \par \par Of note, she has a history of bilateral LCIS, s/p bilateral mastectomy. Genetic testing was negative. \par \par Laboratory studies from 5/3/22 reviewed and notable for: INR 1.66\par D-dimer <150, iron studies sufficient, B12/folate sufficient, ferritin 557, WBC 6.29, Hb 12.8, Plt 187\par \par HCM: \par - COVID vaccination: s/p 3 doses \par - Colonoscopy: Colonoscopy with Dr. Sherman- reportedly unremarkable in 2022 \par - Gyn: Pap 2022, normal, hysterectomy removed in 2017 for fibroids, still has ovaries, fallopian tubes and cervix \par - Mammo: no longer screening, s/p bilateral mastectomy \par \par SH: \par - Occupation: works for [employer] as a  \par - Living situation: lives in [place] with her \par - Smoking/etoh/illicits: former smoker, quit at age 25, rare etoh, denies illicits \par - Exercise: not very physically active, previously played ice hockey \par \par FH: \par - Her brother  of pancreatic cancer at age 55\par - Her mother  of breast cancer at age 62, diagnosed at age 55\par - Her sister was diagnosed with DCIS at age 54\par - Father and pAunt had hereditary hemochromatosis \par - Father  of liver cancer at age 72\par \par Eliquis 5mg BID - [facility] [phone] [de-identified] : Estefania is here for follow up on 7/14/22 for hypercoagulable state. \par \par At today's visit she reports that she is feeling much better. She denies any bleeding, her abdominal pain resolved. She is doing very well on eliquis. She does have occasional bruising. Her hematuria has resolved.  She reports a good energy level and appetite.  Denies fevers, chills, chest pain, shortness of breath, nausea, vomiting, diarrhea.\par She did not get a chance to have the R breast ultrasound. \par \par Laboratory studies reviewed at today's visit and notable for: WBC 6.61, Hb 13.4, Plt 171 \par

## 2022-07-15 NOTE — RESULTS/DATA
[FreeTextEntry1] : Referred by: Dr. Medhat Watkins \par Cardiology: Dr. Amarjit Easton \par [facility]\par \par Ms. Martin presented at age 56 in May 2022 for evaluation of APLS, history of PE. \par The patient has a medical history of bilateral pleomorphic LCIS s/p bilateral mastectomy 2012 (Dr. Karson Richardson, Dr. Mercedes Cuello), FVL heterozygosity, hereditary hemochromatosis heterozygosity, TIA x 3 (2017, 2018, 2021), AAA s/p repair, DVT/PE s/p IVC filter, now on coumadin. \par \par She has multiple risk factors for recurrent clot and should remain on anticoagulation at this time.  She has not been adequately anticoagulated on coumadin- lab studies revealed low/mod risk APLS. Switched to eliquis for more stable anticoagulation at this time. Risks/benefits discussed with the patient in detail. Continue ASA. \par Follow up R breast US.

## 2022-07-15 NOTE — CONSULT LETTER
[Dear  ___] : Dear  [unfilled], [Courtesy Letter:] : I had the pleasure of seeing your patient, [unfilled], in my office today. [Please see my note below.] : Please see my note below. [Consult Closing:] : Thank you very much for allowing me to participate in the care of this patient.  If you have any questions, please do not hesitate to contact me. [Sincerely,] : Sincerely, [FreeTextEntry2] : Dr. Medhat Watkins  [FreeTextEntry3] : Dr. Lili Bustamante\par

## 2022-07-15 NOTE — PHYSICAL EXAM
[Restricted in physically strenuous activity but ambulatory and able to carry out work of a light or sedentary nature] : Status 1- Restricted in physically strenuous activity but ambulatory and able to carry out work of a light or sedentary nature, e.g., light house work, office work [Normal] : affect appropriate [de-identified] : generally well appearing female, NAD  [de-identified] : s/p sternotomy, keloids present at surgical site  [de-identified] : s/p bilateral mastectomy for LCIS, small nodule in R breast 1-2mm, no palpable LAD

## 2022-08-02 ENCOUNTER — APPOINTMENT (OUTPATIENT)
Dept: CARDIOLOGY | Facility: CLINIC | Age: 57
End: 2022-08-02

## 2022-08-02 ENCOUNTER — NON-APPOINTMENT (OUTPATIENT)
Age: 57
End: 2022-08-02

## 2022-08-02 PROCEDURE — G2066: CPT

## 2022-08-02 PROCEDURE — 93298 REM INTERROG DEV EVAL SCRMS: CPT

## 2022-08-19 NOTE — H&P PST ADULT - GENERAL
Patient needing swabbed for covid, has COPD and does not want anything serious to happen.    Covid test positive, MD aware  
negative

## 2022-09-06 ENCOUNTER — APPOINTMENT (OUTPATIENT)
Dept: CARDIOLOGY | Facility: CLINIC | Age: 57
End: 2022-09-06

## 2022-09-06 ENCOUNTER — NON-APPOINTMENT (OUTPATIENT)
Age: 57
End: 2022-09-06

## 2022-09-06 PROCEDURE — G2066: CPT

## 2022-09-06 PROCEDURE — 93298 REM INTERROG DEV EVAL SCRMS: CPT

## 2022-09-13 ENCOUNTER — APPOINTMENT (OUTPATIENT)
Dept: ULTRASOUND IMAGING | Facility: CLINIC | Age: 57
End: 2022-09-13

## 2022-09-13 ENCOUNTER — NON-APPOINTMENT (OUTPATIENT)
Age: 57
End: 2022-09-13

## 2022-09-13 ENCOUNTER — RESULT REVIEW (OUTPATIENT)
Age: 57
End: 2022-09-13

## 2022-09-13 PROCEDURE — 76641 ULTRASOUND BREAST COMPLETE: CPT | Mod: RT

## 2022-10-07 ENCOUNTER — LABORATORY RESULT (OUTPATIENT)
Age: 57
End: 2022-10-07

## 2022-10-07 ENCOUNTER — APPOINTMENT (OUTPATIENT)
Dept: BREAST CENTER | Facility: CLINIC | Age: 57
End: 2022-10-07

## 2022-10-07 VITALS
SYSTOLIC BLOOD PRESSURE: 126 MMHG | BODY MASS INDEX: 32.94 KG/M2 | WEIGHT: 179 LBS | HEIGHT: 62 IN | HEART RATE: 49 BPM | DIASTOLIC BLOOD PRESSURE: 72 MMHG

## 2022-10-07 PROCEDURE — 99243 OFF/OP CNSLTJ NEW/EST LOW 30: CPT

## 2022-10-07 PROCEDURE — 10005 FNA BX W/US GDN 1ST LES: CPT

## 2022-10-07 RX ORDER — FAMOTIDINE 20 MG/1
20 TABLET, FILM COATED ORAL DAILY
Refills: 0 | Status: DISCONTINUED | COMMUNITY
End: 2022-10-07

## 2022-10-07 RX ORDER — ATORVASTATIN CALCIUM 20 MG/1
20 TABLET, FILM COATED ORAL DAILY
Qty: 90 | Refills: 3 | Status: DISCONTINUED | COMMUNITY
End: 2022-10-07

## 2022-10-07 RX ORDER — APIXABAN 5 MG/1
5 TABLET, FILM COATED ORAL
Qty: 60 | Refills: 1 | Status: DISCONTINUED | COMMUNITY
End: 2022-10-07

## 2022-10-07 RX ORDER — ASPIRIN 81 MG
81 TABLET,CHEWABLE ORAL
Refills: 0 | Status: ACTIVE | COMMUNITY

## 2022-10-07 RX ORDER — CHOLECALCIFEROL (VITAMIN D3) 25 MCG
TABLET ORAL
Refills: 0 | Status: ACTIVE | COMMUNITY

## 2022-10-07 NOTE — PHYSICAL EXAM
[Sclera nonicteric] : sclera nonicteric [Supple] : supple [No Supraclavicular Adenopathy] : no supraclavicular adenopathy [No Cervical Adenopathy] : no cervical adenopathy [No Thyromegaly] : no thyromegaly [Clear to Auscultation Bilat] : clear to auscultation bilaterally [Normal Sinus Rhythm] : normal sinus rhythm [Soft] : abdomen soft [Not Tender] : non-tender [No Palpable Masses] : no abdominal mass palpated [EOMI] : extra ocular movement intact [No Axillary Lymphadenopathy] : no left axillary lymphadenopathy [de-identified] : Midline sternal scar.  Scattered keloids, especially xiphoid area. [de-identified] : LORENZO reconstruction with ELLEN.  5 mm mobile mass 2N3. [de-identified] : LORENZO reconstruction with ELLEN. [de-identified] : Multiple scars. No umbilicus. [de-identified] : Bilateral anterior shoulder scars.

## 2022-10-07 NOTE — PAST MEDICAL HISTORY
[Surgical Menopause] : The patient is in surgical menopause [Menarche Age ____] : age at menarche was [unfilled] [Menopause Age____] : age at menopause was [unfilled] [Total Preg ___] : G[unfilled] [Live Births ___] : P[unfilled]  [FreeTextEntry7] : ages 15-30

## 2022-10-07 NOTE — PROCEDURE
[FreeTextEntry1] : Right breast ultrasound and ultrasound guided aspiration [FreeTextEntry2] : Assess lesion [FreeTextEntry3] : The right 2N3 breast shows a well circumscribed hypoechoic smooth mass without posterior enhancement. Possible etiologies were discussed. An ultrasound guided aspiration was performed to resolution for a small amount of oily material.  This is c/w an oil cyst.  Cytology was sent.

## 2022-10-07 NOTE — HISTORY OF PRESENT ILLNESS
[FreeTextEntry1] : This is a 56 year old  female who underwent bilateral mastectomies with LORENZO flap reconstruction in 5/2012 for pleomorphic LCIS in her right breast.  \par \par She was last seen in 2014 for a small lump in her left breast which turned out to be an oil cyst that was aspirated.\par \par She now was found to have a small lump in her right breast by Dr. Bustamante.

## 2022-10-07 NOTE — CONSULT LETTER
[Dear  ___] : Dear  [unfilled], [Consult Letter:] : I had the pleasure of evaluating your patient, [unfilled]. [Sincerely,] : Sincerely, [DrLeif  ___] : Dr. AGUIAR

## 2022-10-11 ENCOUNTER — APPOINTMENT (OUTPATIENT)
Dept: CARDIOLOGY | Facility: CLINIC | Age: 57
End: 2022-10-11

## 2022-10-11 ENCOUNTER — NON-APPOINTMENT (OUTPATIENT)
Age: 57
End: 2022-10-11

## 2022-10-11 PROCEDURE — 93298 REM INTERROG DEV EVAL SCRMS: CPT

## 2022-10-11 PROCEDURE — G2066: CPT

## 2022-10-12 ENCOUNTER — OUTPATIENT (OUTPATIENT)
Dept: OUTPATIENT SERVICES | Facility: HOSPITAL | Age: 57
LOS: 1 days | End: 2022-10-12
Payer: COMMERCIAL

## 2022-10-12 DIAGNOSIS — Z98.890 OTHER SPECIFIED POSTPROCEDURAL STATES: Chronic | ICD-10-CM

## 2022-10-12 DIAGNOSIS — Z90.711 ACQUIRED ABSENCE OF UTERUS WITH REMAINING CERVICAL STUMP: Chronic | ICD-10-CM

## 2022-10-12 DIAGNOSIS — Z98.89 OTHER SPECIFIED POSTPROCEDURAL STATES: Chronic | ICD-10-CM

## 2022-10-12 DIAGNOSIS — D68.00 VON WILLEBRAND DISEASE, UNSPECIFIED: ICD-10-CM

## 2022-10-12 DIAGNOSIS — Z41.1 ENCOUNTER FOR COSMETIC SURGERY: Chronic | ICD-10-CM

## 2022-10-12 DIAGNOSIS — R59.1 GENERALIZED ENLARGED LYMPH NODES: Chronic | ICD-10-CM

## 2022-10-12 DIAGNOSIS — Z90.13 ACQUIRED ABSENCE OF BILATERAL BREASTS AND NIPPLES: Chronic | ICD-10-CM

## 2022-10-12 DIAGNOSIS — Q17.9 CONGENITAL MALFORMATION OF EAR, UNSPECIFIED: Chronic | ICD-10-CM

## 2022-10-13 ENCOUNTER — RESULT REVIEW (OUTPATIENT)
Age: 57
End: 2022-10-13

## 2022-10-13 ENCOUNTER — LABORATORY RESULT (OUTPATIENT)
Age: 57
End: 2022-10-13

## 2022-10-13 ENCOUNTER — APPOINTMENT (OUTPATIENT)
Dept: HEMATOLOGY ONCOLOGY | Facility: CLINIC | Age: 57
End: 2022-10-13

## 2022-10-13 VITALS
HEART RATE: 62 BPM | SYSTOLIC BLOOD PRESSURE: 116 MMHG | HEIGHT: 62 IN | WEIGHT: 190.4 LBS | TEMPERATURE: 97 F | DIASTOLIC BLOOD PRESSURE: 76 MMHG | OXYGEN SATURATION: 96 % | BODY MASS INDEX: 35.04 KG/M2

## 2022-10-13 LAB
BASOPHILS # BLD AUTO: 0.05 K/UL — SIGNIFICANT CHANGE UP (ref 0–0.2)
BASOPHILS NFR BLD AUTO: 0.6 % — SIGNIFICANT CHANGE UP (ref 0–2)
EOSINOPHIL # BLD AUTO: 0.12 K/UL — SIGNIFICANT CHANGE UP (ref 0–0.5)
EOSINOPHIL NFR BLD AUTO: 1.5 % — SIGNIFICANT CHANGE UP (ref 0–6)
HCT VFR BLD CALC: 38.8 % — SIGNIFICANT CHANGE UP (ref 34.5–45)
HGB BLD-MCNC: 13 G/DL — SIGNIFICANT CHANGE UP (ref 11.5–15.5)
IMM GRANULOCYTES NFR BLD AUTO: 0.3 % — SIGNIFICANT CHANGE UP (ref 0–0.9)
LYMPHOCYTES # BLD AUTO: 1.94 K/UL — SIGNIFICANT CHANGE UP (ref 1–3.3)
LYMPHOCYTES # BLD AUTO: 25 % — SIGNIFICANT CHANGE UP (ref 13–44)
MCHC RBC-ENTMCNC: 31.5 PG — SIGNIFICANT CHANGE UP (ref 27–34)
MCHC RBC-ENTMCNC: 33.5 GM/DL — SIGNIFICANT CHANGE UP (ref 32–36)
MCV RBC AUTO: 93.9 FL — SIGNIFICANT CHANGE UP (ref 80–100)
MONOCYTES # BLD AUTO: 0.42 K/UL — SIGNIFICANT CHANGE UP (ref 0–0.9)
MONOCYTES NFR BLD AUTO: 5.4 % — SIGNIFICANT CHANGE UP (ref 2–14)
NEUTROPHILS # BLD AUTO: 5.2 K/UL — SIGNIFICANT CHANGE UP (ref 1.8–7.4)
NEUTROPHILS NFR BLD AUTO: 67.2 % — SIGNIFICANT CHANGE UP (ref 43–77)
NRBC # BLD: 0 /100 WBCS — SIGNIFICANT CHANGE UP (ref 0–0)
PLATELET # BLD AUTO: 173 K/UL — SIGNIFICANT CHANGE UP (ref 150–400)
RBC # BLD: 4.13 M/UL — SIGNIFICANT CHANGE UP (ref 3.8–5.2)
RBC # FLD: 13 % — SIGNIFICANT CHANGE UP (ref 10.3–14.5)
WBC # BLD: 7.75 K/UL — SIGNIFICANT CHANGE UP (ref 3.8–10.5)
WBC # FLD AUTO: 7.75 K/UL — SIGNIFICANT CHANGE UP (ref 3.8–10.5)

## 2022-10-13 PROCEDURE — 85027 COMPLETE CBC AUTOMATED: CPT

## 2022-10-13 PROCEDURE — 99214 OFFICE O/P EST MOD 30 MIN: CPT

## 2022-10-13 RX ORDER — APIXABAN 5 MG/1
5 TABLET, FILM COATED ORAL
Qty: 60 | Refills: 1 | Status: DISCONTINUED | COMMUNITY
Start: 2022-09-13 | End: 2022-10-13

## 2022-10-13 NOTE — RESULTS/DATA
[FreeTextEntry1] : Referred by: Dr. Medhat Watkins \par Cardiology: Dr. Amarjit Easton \par [facility]\par \par Ms. Martin presented at age 56 in May 2022 for evaluation of APLS, history of PE. \par The patient has a medical history of bilateral pleomorphic LCIS s/p bilateral mastectomy 2012 (Dr. Karson Richardson, Dr. Mercedes Cuello), FVL heterozygosity, hereditary hemochromatosis heterozygosity, TIA x 3 (2017, 2018, 2021), AAA s/p repair, DVT/PE s/p IVC filter, now on coumadin. \par \par She has multiple risk factors for recurrent clot and should remain on anticoagulation at this time.  She has not been adequately anticoagulated on coumadin- lab studies revealed low/mod risk APLS. Switched to eliquis for more stable anticoagulation at this time. Risks/benefits discussed with the patient in detail. Continue ASA. \par \par PE/ Factor V leiden- continue eliquis\par Elevated ferritin- hemochromatosis heterozygous, repeat ferritin\par Ordered for DEXA

## 2022-10-13 NOTE — PHYSICAL EXAM
[Restricted in physically strenuous activity but ambulatory and able to carry out work of a light or sedentary nature] : Status 1- Restricted in physically strenuous activity but ambulatory and able to carry out work of a light or sedentary nature, e.g., light house work, office work [Normal] : affect appropriate [de-identified] : generally well appearing female, NAD  [de-identified] : s/p sternotomy, keloids present at surgical site  [de-identified] : s/p bilateral mastectomy for LCIS, small nodule in R breast 1-2mm, no palpable LAD (last visit)

## 2022-10-13 NOTE — HISTORY OF PRESENT ILLNESS
[de-identified] : Referred by: Dr. Medhat Watkins \par Previously followed by [facility] Dr. Oliva\par \par Ms. Martin presented at age 56 in May 2022 for evaluation of APLS, history of PE. \par The patient has a medical history of bilateral pleomorphic LCIS s/p bilateral mastectomy  (Dr. Karson Richardson, Dr. Mercedes Cuello), FVL heterozygosity, hereditary hemochromatosis heterozygosity, TIA x 3 (, , ), AAA s/p repair, DVT/PE s/p IVC filter, now on coumadin. \par \par Presenting HPI: Estefania is here to establish care for history of pulmonary embolism ( s/p surgical procedure and travelling), and possible antiphospholipid syndrome.  She has previously been following with Dr. Oliva from [facility] for history of factor V Leiden heterozygosity, hereditary hemochromatosis (heterozygosity C282Y), history of TIA, on aspirin, history of DVT/PE.  Following her diagnosis of pulmonary embolism she was initiated on Xarelto which she was tolerating well.  She subsequently developed abdominal pain and was found to have a blood clot in the hepatic vein on imaging and her anticoagulation was switched to Coumadin given concern for failure that area of Xarelto as well as possible antiphospholipid syndrome given persistently elevated IgM anticardiolipin antibody.  In retrospect her imaging was reviewed by Dr. Caro and she did not have a blood clot in the hepatic vein.\par \par Since she has been on Coumadin she has had difficulty maintaining a therapeutic INR.  She is currently taking 6 mg daily.  She states she is not feeling well and has become more anemic- required iron infusion recently. She also reports that she has been having episodes of hematuria and is being worked up for possible kidney stones.  She saw Dr. Red from urology and cystoscopy was performed which was normal.  She has had multiple urine cultures which were negative for infection. Her plavix has been discontinued. She also stopped her ASA last Thursday 2/2 hematuria which has resolved. Seen yesterday for INR check at [facility] - INR 1.8. \par \par Of note, she has a history of bilateral LCIS, s/p bilateral mastectomy. Genetic testing was negative. \par \par Laboratory studies from 5/3/22 reviewed and notable for: INR 1.66\par D-dimer <150, iron studies sufficient, B12/folate sufficient, ferritin 557, WBC 6.29, Hb 12.8, Plt 187\par \par SH: \par - Occupation: works for [employer] as a  \par - Living situation: lives in [place] with her \par - Smoking/etoh/illicits: former smoker, quit at age 25, rare etoh, denies illicits \par - Exercise: not very physically active, previously played ice hockey \par \par FH: \par - Her brother  of pancreatic cancer at age 55\par - Her mother  of breast cancer at age 62, diagnosed at age 55\par - Her sister was diagnosed with DCIS at age 54\par - Father and pAunt had hereditary hemochromatosis \par - Father  of liver cancer at age 72\par \par Eliquis 5mg BID - [facility] [phone] [de-identified] : Estefania is here for follow up on 10/13/22 for hypercoagulable state. \par \par She was seen by Dr. Dunham on 10/7/22- The patient is s/p bilateral mastectomies for LCIS.The patient had a small oil cyst in the right reconstruction.  Cytology resulted as acellular. \par \par At today's visit she is feeling very well. She remains on eliquis 5mg BID. She denies any bleeding or bruising. Her psych medications have been adjusted and she is sleeping much better. She had a COVID infection about 4 weeks ago but was mild and she has fully recovered. She denies fevers, chills, chest pain, shortness of breath, nausea, vomiting, diarrhea.\par \par Laboratory studies reviewed at today's visit and notable for: WBC 7.75, Hb 13, Plt 173 \par \par Health Care Maintenance: Updated 10/13/22\par Mammogram: no longer screening, s/p bilateral mastectomy \par Colonoscopy:  Colonoscopy with Dr. Sherman- reportedly unremarkable in 1/2022 \par Pap smear: Pap 2/2022, normal, hysterectomy removed in 2017 for fibroids, still has ovaries, fallopian tubes and cervix \par Dentist: UTD\par PCP: Dr. Mila Morris ([facility]), [facility] \par Dermatology screen: seen 6 months ago, Dr. Tavares \par Genetic screen: BRCA negative \par DEXA: 8/2019, osteopenia, DUE \par \par Vaccinations: \par COVID vaccination: s/p 3 doses, also had a recent COVID infection in 9/2022 \par Flu vaccine: DUE

## 2022-10-14 LAB
DEPRECATED D DIMER PPP IA-ACNC: 202 NG/ML DDU
FERRITIN SERPL-MCNC: 293 NG/ML
IRON SATN MFR SERPL: 16 %
IRON SERPL-MCNC: 47 UG/DL
TIBC SERPL-MCNC: 292 UG/DL
UIBC SERPL-MCNC: 245 UG/DL

## 2022-11-15 ENCOUNTER — APPOINTMENT (OUTPATIENT)
Dept: CARDIOLOGY | Facility: CLINIC | Age: 57
End: 2022-11-15

## 2022-11-15 ENCOUNTER — NON-APPOINTMENT (OUTPATIENT)
Age: 57
End: 2022-11-15

## 2022-11-15 PROCEDURE — G2066: CPT

## 2022-11-15 PROCEDURE — 93298 REM INTERROG DEV EVAL SCRMS: CPT

## 2022-12-09 ENCOUNTER — OUTPATIENT (OUTPATIENT)
Dept: OUTPATIENT SERVICES | Facility: HOSPITAL | Age: 57
LOS: 1 days | End: 2022-12-09
Payer: COMMERCIAL

## 2022-12-09 DIAGNOSIS — Z98.89 OTHER SPECIFIED POSTPROCEDURAL STATES: Chronic | ICD-10-CM

## 2022-12-09 DIAGNOSIS — Z90.13 ACQUIRED ABSENCE OF BILATERAL BREASTS AND NIPPLES: Chronic | ICD-10-CM

## 2022-12-09 DIAGNOSIS — R59.1 GENERALIZED ENLARGED LYMPH NODES: Chronic | ICD-10-CM

## 2022-12-09 DIAGNOSIS — Z41.1 ENCOUNTER FOR COSMETIC SURGERY: Chronic | ICD-10-CM

## 2022-12-09 DIAGNOSIS — Q17.9 CONGENITAL MALFORMATION OF EAR, UNSPECIFIED: Chronic | ICD-10-CM

## 2022-12-09 DIAGNOSIS — D68.00 VON WILLEBRAND DISEASE, UNSPECIFIED: ICD-10-CM

## 2022-12-09 DIAGNOSIS — D50.9 IRON DEFICIENCY ANEMIA, UNSPECIFIED: ICD-10-CM

## 2022-12-09 DIAGNOSIS — Z98.890 OTHER SPECIFIED POSTPROCEDURAL STATES: Chronic | ICD-10-CM

## 2022-12-09 DIAGNOSIS — Z90.711 ACQUIRED ABSENCE OF UTERUS WITH REMAINING CERVICAL STUMP: Chronic | ICD-10-CM

## 2022-12-12 ENCOUNTER — RESULT REVIEW (OUTPATIENT)
Age: 57
End: 2022-12-12

## 2022-12-12 ENCOUNTER — APPOINTMENT (OUTPATIENT)
Dept: HEMATOLOGY ONCOLOGY | Facility: CLINIC | Age: 57
End: 2022-12-12

## 2022-12-12 ENCOUNTER — NON-APPOINTMENT (OUTPATIENT)
Age: 57
End: 2022-12-12

## 2022-12-12 VITALS
HEIGHT: 62 IN | TEMPERATURE: 96.6 F | HEART RATE: 61 BPM | SYSTOLIC BLOOD PRESSURE: 125 MMHG | BODY MASS INDEX: 34.96 KG/M2 | DIASTOLIC BLOOD PRESSURE: 80 MMHG | OXYGEN SATURATION: 95 % | WEIGHT: 190 LBS

## 2022-12-12 LAB
BASOPHILS # BLD AUTO: 0.07 K/UL — SIGNIFICANT CHANGE UP (ref 0–0.2)
BASOPHILS NFR BLD AUTO: 0.8 % — SIGNIFICANT CHANGE UP (ref 0–2)
EOSINOPHIL # BLD AUTO: 0.19 K/UL — SIGNIFICANT CHANGE UP (ref 0–0.5)
EOSINOPHIL NFR BLD AUTO: 2.1 % — SIGNIFICANT CHANGE UP (ref 0–6)
HCT VFR BLD CALC: 38.8 % — SIGNIFICANT CHANGE UP (ref 34.5–45)
HGB BLD-MCNC: 13 G/DL — SIGNIFICANT CHANGE UP (ref 11.5–15.5)
IMM GRANULOCYTES NFR BLD AUTO: 0.4 % — SIGNIFICANT CHANGE UP (ref 0–0.9)
LYMPHOCYTES # BLD AUTO: 1.42 K/UL — SIGNIFICANT CHANGE UP (ref 1–3.3)
LYMPHOCYTES # BLD AUTO: 16 % — SIGNIFICANT CHANGE UP (ref 13–44)
MCHC RBC-ENTMCNC: 30.8 PG — SIGNIFICANT CHANGE UP (ref 27–34)
MCHC RBC-ENTMCNC: 33.5 GM/DL — SIGNIFICANT CHANGE UP (ref 32–36)
MCV RBC AUTO: 91.9 FL — SIGNIFICANT CHANGE UP (ref 80–100)
MONOCYTES # BLD AUTO: 0.54 K/UL — SIGNIFICANT CHANGE UP (ref 0–0.9)
MONOCYTES NFR BLD AUTO: 6.1 % — SIGNIFICANT CHANGE UP (ref 2–14)
NEUTROPHILS # BLD AUTO: 6.63 K/UL — SIGNIFICANT CHANGE UP (ref 1.8–7.4)
NEUTROPHILS NFR BLD AUTO: 74.6 % — SIGNIFICANT CHANGE UP (ref 43–77)
NRBC # BLD: 0 /100 WBCS — SIGNIFICANT CHANGE UP (ref 0–0)
PLATELET # BLD AUTO: 241 K/UL — SIGNIFICANT CHANGE UP (ref 150–400)
RBC # BLD: 4.22 M/UL — SIGNIFICANT CHANGE UP (ref 3.8–5.2)
RBC # FLD: 12.9 % — SIGNIFICANT CHANGE UP (ref 10.3–14.5)
WBC # BLD: 8.89 K/UL — SIGNIFICANT CHANGE UP (ref 3.8–10.5)
WBC # FLD AUTO: 8.89 K/UL — SIGNIFICANT CHANGE UP (ref 3.8–10.5)

## 2022-12-12 PROCEDURE — 85027 COMPLETE CBC AUTOMATED: CPT

## 2022-12-12 PROCEDURE — 99214 OFFICE O/P EST MOD 30 MIN: CPT

## 2022-12-13 LAB
DEPRECATED D DIMER PPP IA-ACNC: 661 NG/ML DDU
FERRITIN SERPL-MCNC: 469 NG/ML
IRON SATN MFR SERPL: 27 %
IRON SERPL-MCNC: 65 UG/DL
TIBC SERPL-MCNC: 239 UG/DL
UIBC SERPL-MCNC: 174 UG/DL

## 2022-12-13 NOTE — HISTORY OF PRESENT ILLNESS
12/17/2020              Flora Carrion        [facility] [address]         Dear Chris Jauregui,    This letter is to inform you that our office has made several attempts to reach you by phone without success.   We were attempting t [de-identified] : Referred by: Dr. Medhat Watkins \par Previously followed by [facility] Dr. Oliva\par \par Ms. Martin initially presented at age 56 in May 2022 for evaluation of APLS, history of PE. \par The patient reported a medical history of bilateral pleomorphic LCIS s/p bilateral mastectomy  (Dr. Karson Richardson, Dr. Mercedes Cuello), FVL heterozygosity, hereditary hemochromatosis heterozygosity, TIA x 3 (, , ), AAA s/p repair, DVT/PE s/p IVC filter, on coumadin at the time of her initial visit. \par \par Presenting HPI: Estefania is here to establish care for history of pulmonary embolism ( s/p surgical procedure and travelling), and possible antiphospholipid syndrome.  She has previously been following with Dr. Oliva from [facility] for history of factor V Leiden heterozygosity, hereditary hemochromatosis (heterozygosity C282Y), history of TIA, on aspirin, history of DVT/PE.  Following her diagnosis of pulmonary embolism she was initiated on Xarelto which she was tolerating well.  She subsequently developed abdominal pain and was found to have a blood clot in the hepatic vein on imaging and her anticoagulation was switched to Coumadin given concern for failure that area of Xarelto as well as possible antiphospholipid syndrome given persistently elevated IgM anticardiolipin antibody.  In retrospect her imaging was reviewed by Dr. Caro and she did not have a blood clot in the hepatic vein.\par \par While on Coumadin she had difficulty maintaining a therapeutic INR.  She was taking 6 mg daily.  She stated she is not feeling well and has become more anemic- required iron infusion recently. She also reports that she has been having episodes of hematuria and is being worked up for possible kidney stones.  She saw Dr. Red from urology and cystoscopy was performed which was normal.  She has had multiple urine cultures which were negative for infection. Her plavix has been discontinued. She also stopped her ASA last Thursday 2/2 hematuria which has resolved. Seen yesterday for INR check at [facility] - INR 1.8. \par \par Of note, she has a history of bilateral LCIS, s/p bilateral mastectomy. Genetic testing was negative. \par \par Laboratory studies from 5/3/22 reviewed and notable for: INR 1.66\par D-dimer <150, iron studies sufficient, B12/folate sufficient, ferritin 557, WBC 6.29, Hb 12.8, Plt 187\par \par SH: \par - Occupation: works for [employer] as a  \par - Living situation: lives in [place] with her \par - Smoking/etoh/illicits: former smoker, quit at age 25, rare etoh, denies illicits \par - Exercise: not very physically active, previously played ice hockey \par \par FH: \par - Her brother  of pancreatic cancer at age 55\par - Her mother  of breast cancer at age 62, diagnosed at age 55\par - Her sister was diagnosed with DCIS at age 54\par - Father and pAunt had hereditary hemochromatosis \par - Father  of liver cancer at age 72\par \par Eliquis 5mg BID - [facility] [phone] [de-identified] : Estefania is here for short interval follow up on 12/12/22 for hypercoagulable state. \par \par S/p appendectomy at [facility] on 12/6/22.  Pathology showed benign, gangrenous tissue.  Healing well post-operatively.  Appetite stable, remains afebrile, no bleeding, no difficulties with voiding, having some loose stools.  Completed po antibiotics yesterday.  To see surgery this week.\par \par She was seen by Dr. Dunham on 10/7/22 - s/p bilateral mastectomies for LCIS with LORENZO flap. The patient had a small oil cyst in the right reconstruction.  Cytology resulted as acellular. \par \par At today's visit she is feeling very well. She remains on eliquis 5mg BID. She denies any bleeding or bruising. Her psych medications were adjusted and she is sleeping much better. She had a COVID infection in 9/22 ago but was mild and she has fully recovered. \par \par Laboratory studies reviewed at today's visit and notable for: WBC 8.89. Hb 13, Plt 241 \par \par Health Care Maintenance: Updated 12/12/22\par Mammogram: no longer screening, s/p bilateral mastectomy \par Colonoscopy:  Colonoscopy with Dr. Sherman- reportedly unremarkable in 1/2022 \par Pap smear: Pap 2/2022, normal, hysterectomy removed in 2017 for fibroids, still has ovaries, fallopian tubes and cervix - reminded to schedule routine exam\par Dentist: UTD\par PCP: Dr. Mila Morris ([facility]), [place] \par Dermatology screen: seen 6 months ago, Dr. Tavares \par Genetic screen: BRCA negative \par DEXA: 8/2019, osteopenia, has script - will schedule\par \par Vaccinations: \par COVID vaccination: s/p 3 doses, also had a recent COVID infection in 9/2022 \par Flu vaccine: DUE

## 2022-12-13 NOTE — PHYSICAL EXAM
[Restricted in physically strenuous activity but ambulatory and able to carry out work of a light or sedentary nature] : Status 1- Restricted in physically strenuous activity but ambulatory and able to carry out work of a light or sedentary nature, e.g., light house work, office work [Normal] : affect appropriate [de-identified] : generally well appearing female, NAD  [de-identified] : s/p sternotomy, keloids present at surgical site  [de-identified] : s/p bilateral mastectomy for LCIS with LORENZO flap reconstruction; previously noted small nodule in R breast  cyst resolved (s/p aspiration). No LAD. [de-identified] : Soft, non-tender, non-distended.  Healing laparscopic incisions from recent lap appendectomy (no erythema or discharge)

## 2022-12-13 NOTE — RESULTS/DATA
[FreeTextEntry1] : Referred by: Dr. Medhat Watkins \par Cardiology: Dr. Amarjit Easton \par [facility]\par \par Ms. Martin initially presented at age 56 in May 2022 for evaluation of APLS, history of PE. \par The patient has a medical history of bilateral pleomorphic LCIS s/p bilateral mastectomy 2012 (Dr. Karson Richardson, Dr. Mercedes Cuello), FVL heterozygosity, hereditary hemochromatosis heterozygosity, TIA x 3 (2017, 2018, 2021), AAA s/p repair, DVT/PE s/p IVC filter, previously on coumadin. \par \par She has multiple risk factors for recurrent clot and should remain on anticoagulation at this time.  She has not been adequately anticoagulated on coumadin- lab studies revealed low/mod risk APLS. Switched to eliquis for more stable anticoagulation at this time. Risks/benefits discussed with the patient in detail. Continue ASA. \par \par PE/ Factor V leiden- continue eliquis 5 mg po BID\par Elevated ferritin- hemochromatosis heterozygous, follow ferritin. Repeat ferritin 469 today. \par Has script for DEXA - needs to repeat

## 2022-12-14 ENCOUNTER — NON-APPOINTMENT (OUTPATIENT)
Age: 57
End: 2022-12-14

## 2022-12-20 ENCOUNTER — APPOINTMENT (OUTPATIENT)
Dept: CARDIOLOGY | Facility: CLINIC | Age: 57
End: 2022-12-20
Payer: COMMERCIAL

## 2022-12-20 ENCOUNTER — NON-APPOINTMENT (OUTPATIENT)
Age: 57
End: 2022-12-20

## 2022-12-20 PROCEDURE — 93298 REM INTERROG DEV EVAL SCRMS: CPT

## 2022-12-20 PROCEDURE — G2066: CPT

## 2023-01-11 ENCOUNTER — APPOINTMENT (OUTPATIENT)
Dept: HEMATOLOGY ONCOLOGY | Facility: CLINIC | Age: 58
End: 2023-01-11

## 2023-01-12 ENCOUNTER — APPOINTMENT (OUTPATIENT)
Dept: HEMATOLOGY ONCOLOGY | Facility: CLINIC | Age: 58
End: 2023-01-12
Payer: COMMERCIAL

## 2023-01-12 PROCEDURE — 99213 OFFICE O/P EST LOW 20 MIN: CPT | Mod: 95

## 2023-01-12 NOTE — REASON FOR VISIT
[Home] : at home, [unfilled] , at the time of the visit. [Other Location: e.g. Home (Enter Location, City,State)___] : at [unfilled] [Patient] : the patient [Self] : self [Follow-Up Visit] : a follow-up visit for [FreeTextEntry2] : PE, LCIS

## 2023-01-12 NOTE — HISTORY OF PRESENT ILLNESS
[de-identified] : Referred by: Dr. Medhat Watkins \par Previously followed by [facility] Dr. Oliva\par \par Ms. Martin presented at age 56 in May 2022 for evaluation of APLS, history of PE. \par The patient has a medical history of bilateral pleomorphic LCIS s/p bilateral mastectomy  (Dr. Karson Richardson, Dr. Mercedes Cuello), FVL heterozygosity, hereditary hemochromatosis heterozygosity, TIA x 3 (, , ), AAA s/p repair, DVT/PE s/p IVC filter, now on coumadin. \par \par Presenting HPI: Estefania is here to establish care for history of pulmonary embolism ( s/p surgical procedure and travelling), and possible antiphospholipid syndrome.  She has previously been following with Dr. Oliva from [facility] for history of factor V Leiden heterozygosity, hereditary hemochromatosis (heterozygosity C282Y), history of TIA, on aspirin, history of DVT/PE.  Following her diagnosis of pulmonary embolism she was initiated on Xarelto which she was tolerating well.  She subsequently developed abdominal pain and was found to have a blood clot in the hepatic vein on imaging and her anticoagulation was switched to Coumadin given concern for failure that area of Xarelto as well as possible antiphospholipid syndrome given persistently elevated IgM anticardiolipin antibody.  In retrospect her imaging was reviewed by Dr. Caro and she did not have a blood clot in the hepatic vein.\par \par Since she has been on Coumadin she has had difficulty maintaining a therapeutic INR.  She is currently taking 6 mg daily.  She states she is not feeling well and has become more anemic- required iron infusion recently. She also reports that she has been having episodes of hematuria and is being worked up for possible kidney stones.  She saw Dr. Red from urology and cystoscopy was performed which was normal.  She has had multiple urine cultures which were negative for infection. Her plavix has been discontinued. She also stopped her ASA last Thursday 2/2 hematuria which has resolved. Seen yesterday for INR check at [facility] - INR 1.8. \par \par Of note, she has a history of bilateral LCIS, s/p bilateral mastectomy. Genetic testing was negative. \par \par Laboratory studies from 5/3/22 reviewed and notable for: INR 1.66\par D-dimer <150, iron studies sufficient, B12/folate sufficient, ferritin 557, WBC 6.29, Hb 12.8, Plt 187\par \par SH: \par - Occupation: works for [employer] as a  \par - Living situation: lives in [place] with her \par - Smoking/etoh/illicits: former smoker, quit at age 25, rare etoh, denies illicits \par - Exercise: not very physically active, previously played ice hockey \par \par FH: \par - Her brother  of pancreatic cancer at age 55\par - Her mother  of breast cancer at age 62, diagnosed at age 55\par - Her sister was diagnosed with DCIS at age 54\par - Father and pAunt had hereditary hemochromatosis \par - Father  of liver cancer at age 72\par \par Eliquis 5mg BID - [facility] [phone] [de-identified] : Estefania is here for follow up on 1/12/23 for hypercoagulable state. \par \par Recently admitted to [facility] with appendicitis- s/p appendectomy. She remains on eliquis 5mg BID. She reports a good energy level and appetite. She denies fevers, chills, chest pain, shortness of breath, nausea, vomiting, diarrhea, bleeding or bruising. She saw Dr. Khushboo Saunders for R breast nodule- aspirated by Dr. Dunham and found oil cyst. \par \par Recent ferritin 469, remains elevated\par \par Health Care Maintenance: Updated 1/12/23\par Mammogram: no longer screening, s/p bilateral mastectomy \par Colonoscopy:  Colonoscopy with Dr. Sherman- reportedly unremarkable in 1/2022 \par Pap smear: Pap 2/2022, normal, hysterectomy removed in 2017 for fibroids, still has ovaries, fallopian tubes and cervix \par Dentist: UTD\par PCP: Dr. Mila Morris ([facility]), [facility] \par Dermatology screen: seen 6 months ago, Dr. Tavares \par Genetic screen: BRCA negative \par DEXA: 8/2019, osteopenia, DUE \par \par Vaccinations: \par COVID vaccination: s/p 3 doses, also had a recent COVID infection in 9/2022 \par Flu vaccine: declines

## 2023-01-12 NOTE — PHYSICAL EXAM
[Restricted in physically strenuous activity but ambulatory and able to carry out work of a light or sedentary nature] : Status 1- Restricted in physically strenuous activity but ambulatory and able to carry out work of a light or sedentary nature, e.g., light house work, office work [Normal] : affect appropriate [de-identified] : generally well appearing female, NAD (exam per telemed)  [de-identified] : breathing comfortably  [de-identified] : s/p sternotomy, keloids present at surgical site  [de-identified] : s/p bilateral mastectomy for LCIS, small nodule in R breast 1-2mm, no palpable LAD (last visit)

## 2023-01-12 NOTE — RESULTS/DATA
[FreeTextEntry1] : Referred by: Dr. Medhat Watkins \par Cardiology: Dr. Amarjit Easton \par [facility]\par \par Ms. Martin initially presented at age 56 in May 2022 for evaluation of APLS, history of PE. \par The patient has a medical history of bilateral pleomorphic LCIS s/p bilateral mastectomy 2012 (Dr. Karson Richardson, Dr. Mercedes Cuello), FVL heterozygosity, hereditary hemochromatosis heterozygosity, TIA x 3 (2017, 2018, 2021), AAA s/p repair, DVT/PE s/p IVC filter, previously on coumadin. \par \par She has multiple risk factors for recurrent clot and should remain on anticoagulation at this time.  She has not been adequately anticoagulated on coumadin- lab studies revealed low/mod risk APLS. Switched to eliquis for more stable anticoagulation at this time. Risks/benefits discussed with the patient in detail. Continue ASA. \par \par PE/ Factor V leiden- continue eliquis 5 mg po BID\par Elevated ferritin- hemochromatosis heterozygous, follow ferritin. Repeat ferritin 469 today. \par Has script for DEXA - needs to repeat

## 2023-01-24 ENCOUNTER — NON-APPOINTMENT (OUTPATIENT)
Age: 58
End: 2023-01-24

## 2023-01-24 ENCOUNTER — APPOINTMENT (OUTPATIENT)
Dept: CARDIOLOGY | Facility: CLINIC | Age: 58
End: 2023-01-24
Payer: COMMERCIAL

## 2023-01-24 PROCEDURE — G2066: CPT

## 2023-01-24 PROCEDURE — 93298 REM INTERROG DEV EVAL SCRMS: CPT

## 2023-02-01 ENCOUNTER — APPOINTMENT (OUTPATIENT)
Dept: CARDIOLOGY | Facility: CLINIC | Age: 58
End: 2023-02-01
Payer: COMMERCIAL

## 2023-02-28 ENCOUNTER — APPOINTMENT (OUTPATIENT)
Dept: CARDIOLOGY | Facility: CLINIC | Age: 58
End: 2023-02-28
Payer: COMMERCIAL

## 2023-02-28 ENCOUNTER — NON-APPOINTMENT (OUTPATIENT)
Age: 58
End: 2023-02-28

## 2023-02-28 VITALS
HEART RATE: 58 BPM | SYSTOLIC BLOOD PRESSURE: 112 MMHG | OXYGEN SATURATION: 97 % | WEIGHT: 186 LBS | TEMPERATURE: 97.5 F | BODY MASS INDEX: 34.23 KG/M2 | DIASTOLIC BLOOD PRESSURE: 80 MMHG | HEIGHT: 62 IN

## 2023-02-28 DIAGNOSIS — R06.00 DYSPNEA, UNSPECIFIED: ICD-10-CM

## 2023-02-28 PROCEDURE — 99214 OFFICE O/P EST MOD 30 MIN: CPT | Mod: 25

## 2023-02-28 PROCEDURE — 93000 ELECTROCARDIOGRAM COMPLETE: CPT

## 2023-02-28 PROCEDURE — 93298 REM INTERROG DEV EVAL SCRMS: CPT

## 2023-02-28 PROCEDURE — G2066: CPT

## 2023-02-28 RX ORDER — ARIPIPRAZOLE 5 MG/1
5 TABLET ORAL
Refills: 0 | Status: DISCONTINUED | COMMUNITY
End: 2023-02-28

## 2023-02-28 RX ORDER — PANTOPRAZOLE 40 MG/1
40 TABLET, DELAYED RELEASE ORAL TWICE DAILY
Refills: 0 | Status: ACTIVE | COMMUNITY

## 2023-02-28 RX ORDER — APIXABAN 5 MG/1
5 TABLET, FILM COATED ORAL
Qty: 60 | Refills: 3 | Status: DISCONTINUED | COMMUNITY
Start: 2022-05-04 | End: 2023-02-28

## 2023-02-28 RX ORDER — PANTOPRAZOLE SODIUM 100 %
POWDER (GRAM) MISCELLANEOUS
Refills: 0 | Status: DISCONTINUED | COMMUNITY
End: 2023-02-28

## 2023-02-28 RX ORDER — FOLIC ACID-PYRIDOXINE-CYANOCOBALAMIN TAB 2.5-25-2 MG 2.5-25-2 MG
2.5-25-2 TAB ORAL DAILY
Refills: 0 | Status: DISCONTINUED | COMMUNITY
End: 2023-02-28

## 2023-03-01 ENCOUNTER — NON-APPOINTMENT (OUTPATIENT)
Age: 58
End: 2023-03-01

## 2023-03-01 NOTE — ASSESSMENT
[FreeTextEntry1] : \par Follow-up echo.  Follow-up stress echo.  Blood work. \par Continue anticoagulation.  Follow-up with hematology.  \par Discussed possible removal of implantable loop recorder when at end of service.  \par Unclear need for concomitant aspirin therapy.  \par \par Recurrent TIAs\par History of DVT/PE \par IVC filter\par antiphospholipid \par Prior DAYANA without PFO. \par \par Aortic aneurysm s/p repair 7/15\par Family history of aortic disease\par MR, mild\par Surveillance monitoring \par \par Cont ongoing remote ILR monitoring. \par

## 2023-03-01 NOTE — HISTORY OF PRESENT ILLNESS
[FreeTextEntry1] : DAVY PIRES  is a 57 year old  F\par \par NICOLAS PIRES  is a 57 year old  F\par \par with a history of breast ca, HTN, obesity s/p gastric sleeve, hiatal hernia repair June 2017, DVT/PE s/p IVC filter and NOAC, FACUNDO on CPAP, ascending aortic aneurysm repair using a #28 graft with Dr. 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 \par There is no history of symptomatic congestive heart failure or rheumatic heart disease. \par There is no history of symptomatic arrhythmias including atrial fibrillation.\par \par Prior DAYANA without PFO. \par ILR no AF \par \par Follows with hematology.  Antiphospholipid.  Intolerant to Coumadin.  Now on Eliquis.  \par \par Occasional dyspnea\par Denies exertional chest pain or discomfort. \par Denies orthopnea, weight gain, or LE edema. \par Denies palpitations, lightheadedness, dizziness, or syncope.  \par Denies any unusual bleeding or black/tarry stools. \par \par EKG has sinus bradycardia with anterior T wave changes.  T wave changes are new as compared to prior EKG.\par \par Nuclear stress test 2/19/21: Lexiscan. No evidence of ischemia by EKG. There is a small, mild defect in apical wall that is fixed, suggestive of infarct. Clinical correlation suggested given hx of gastric sleeve and hiatal hernia. Post stress gated wall motion anaylsis; EF 57%, revealing hypokinesis in apical walls.\par \par CT scan describes stable exam replacement of ascending aorta no dissection or intramural hematoma \par \par Carotid Doppler. April 2018. Intimal thickening.

## 2023-03-07 ENCOUNTER — APPOINTMENT (OUTPATIENT)
Dept: ULTRASOUND IMAGING | Facility: CLINIC | Age: 58
End: 2023-03-07
Payer: COMMERCIAL

## 2023-03-07 PROCEDURE — 76700 US EXAM ABDOM COMPLETE: CPT

## 2023-03-16 ENCOUNTER — APPOINTMENT (OUTPATIENT)
Dept: RADIOLOGY | Facility: CLINIC | Age: 58
End: 2023-03-16
Payer: COMMERCIAL

## 2023-03-16 ENCOUNTER — RESULT REVIEW (OUTPATIENT)
Age: 58
End: 2023-03-16

## 2023-03-16 PROCEDURE — 77080 DXA BONE DENSITY AXIAL: CPT

## 2023-03-19 ENCOUNTER — NON-APPOINTMENT (OUTPATIENT)
Age: 58
End: 2023-03-19

## 2023-03-20 ENCOUNTER — OUTPATIENT (OUTPATIENT)
Dept: OUTPATIENT SERVICES | Facility: HOSPITAL | Age: 58
LOS: 1 days | End: 2023-03-20
Payer: COMMERCIAL

## 2023-03-20 ENCOUNTER — APPOINTMENT (OUTPATIENT)
Dept: CARDIOLOGY | Facility: CLINIC | Age: 58
End: 2023-03-20
Payer: COMMERCIAL

## 2023-03-20 DIAGNOSIS — Z98.89 OTHER SPECIFIED POSTPROCEDURAL STATES: Chronic | ICD-10-CM

## 2023-03-20 DIAGNOSIS — R59.1 GENERALIZED ENLARGED LYMPH NODES: Chronic | ICD-10-CM

## 2023-03-20 DIAGNOSIS — Z41.1 ENCOUNTER FOR COSMETIC SURGERY: Chronic | ICD-10-CM

## 2023-03-20 DIAGNOSIS — Q17.9 CONGENITAL MALFORMATION OF EAR, UNSPECIFIED: Chronic | ICD-10-CM

## 2023-03-20 DIAGNOSIS — Z90.13 ACQUIRED ABSENCE OF BILATERAL BREASTS AND NIPPLES: Chronic | ICD-10-CM

## 2023-03-20 DIAGNOSIS — D68.00 VON WILLEBRAND DISEASE, UNSPECIFIED: ICD-10-CM

## 2023-03-20 DIAGNOSIS — Z90.711 ACQUIRED ABSENCE OF UTERUS WITH REMAINING CERVICAL STUMP: Chronic | ICD-10-CM

## 2023-03-20 DIAGNOSIS — Z98.890 OTHER SPECIFIED POSTPROCEDURAL STATES: Chronic | ICD-10-CM

## 2023-03-20 PROCEDURE — 93306 TTE W/DOPPLER COMPLETE: CPT

## 2023-03-23 ENCOUNTER — RESULT REVIEW (OUTPATIENT)
Age: 58
End: 2023-03-23

## 2023-03-23 ENCOUNTER — APPOINTMENT (OUTPATIENT)
Dept: HEMATOLOGY ONCOLOGY | Facility: CLINIC | Age: 58
End: 2023-03-23

## 2023-03-23 LAB
BASOPHILS # BLD AUTO: 0.05 K/UL — SIGNIFICANT CHANGE UP (ref 0–0.2)
BASOPHILS NFR BLD AUTO: 0.7 % — SIGNIFICANT CHANGE UP (ref 0–2)
EOSINOPHIL # BLD AUTO: 0.12 K/UL — SIGNIFICANT CHANGE UP (ref 0–0.5)
EOSINOPHIL NFR BLD AUTO: 1.6 % — SIGNIFICANT CHANGE UP (ref 0–6)
HCT VFR BLD CALC: 41.1 % — SIGNIFICANT CHANGE UP (ref 34.5–45)
HGB BLD-MCNC: 13.9 G/DL — SIGNIFICANT CHANGE UP (ref 11.5–15.5)
IMM GRANULOCYTES NFR BLD AUTO: 0.3 % — SIGNIFICANT CHANGE UP (ref 0–0.9)
LYMPHOCYTES # BLD AUTO: 2.12 K/UL — SIGNIFICANT CHANGE UP (ref 1–3.3)
LYMPHOCYTES # BLD AUTO: 27.9 % — SIGNIFICANT CHANGE UP (ref 13–44)
MCHC RBC-ENTMCNC: 31.3 PG — SIGNIFICANT CHANGE UP (ref 27–34)
MCHC RBC-ENTMCNC: 33.8 GM/DL — SIGNIFICANT CHANGE UP (ref 32–36)
MCV RBC AUTO: 92.6 FL — SIGNIFICANT CHANGE UP (ref 80–100)
MONOCYTES # BLD AUTO: 0.5 K/UL — SIGNIFICANT CHANGE UP (ref 0–0.9)
MONOCYTES NFR BLD AUTO: 6.6 % — SIGNIFICANT CHANGE UP (ref 2–14)
NEUTROPHILS # BLD AUTO: 4.8 K/UL — SIGNIFICANT CHANGE UP (ref 1.8–7.4)
NEUTROPHILS NFR BLD AUTO: 62.9 % — SIGNIFICANT CHANGE UP (ref 43–77)
NRBC # BLD: 0 /100 WBCS — SIGNIFICANT CHANGE UP (ref 0–0)
PLATELET # BLD AUTO: 188 K/UL — SIGNIFICANT CHANGE UP (ref 150–400)
RBC # BLD: 4.44 M/UL — SIGNIFICANT CHANGE UP (ref 3.8–5.2)
RBC # FLD: 13.2 % — SIGNIFICANT CHANGE UP (ref 10.3–14.5)
WBC # BLD: 7.61 K/UL — SIGNIFICANT CHANGE UP (ref 3.8–10.5)
WBC # FLD AUTO: 7.61 K/UL — SIGNIFICANT CHANGE UP (ref 3.8–10.5)

## 2023-03-23 PROCEDURE — 36415 COLL VENOUS BLD VENIPUNCTURE: CPT

## 2023-03-23 PROCEDURE — 85027 COMPLETE CBC AUTOMATED: CPT

## 2023-03-24 LAB
ALBUMIN SERPL ELPH-MCNC: 4.5 G/DL
ALP BLD-CCNC: 94 U/L
ALT SERPL-CCNC: 11 U/L
ANION GAP SERPL CALC-SCNC: 13 MMOL/L
AST SERPL-CCNC: 17 U/L
BILIRUB SERPL-MCNC: 0.4 MG/DL
BUN SERPL-MCNC: 16 MG/DL
CALCIUM SERPL-MCNC: 9.8 MG/DL
CHLORIDE SERPL-SCNC: 105 MMOL/L
CHOLEST SERPL-MCNC: 195 MG/DL
CK SERPL-CCNC: 72 U/L
CO2 SERPL-SCNC: 22 MMOL/L
CREAT SERPL-MCNC: 0.75 MG/DL
EGFR: 93 ML/MIN/1.73M2
ESTIMATED AVERAGE GLUCOSE: 97 MG/DL
FERRITIN SERPL-MCNC: 216 NG/ML
FOLATE SERPL-MCNC: 15.2 NG/ML
GLUCOSE SERPL-MCNC: 91 MG/DL
HBA1C MFR BLD HPLC: 5 %
HDLC SERPL-MCNC: 51 MG/DL
LDLC SERPL CALC-MCNC: 92 MG/DL
MAGNESIUM SERPL-MCNC: 2.2 MG/DL
NONHDLC SERPL-MCNC: 144 MG/DL
NT-PROBNP SERPL-MCNC: 42 PG/ML
POTASSIUM SERPL-SCNC: 4.3 MMOL/L
PROT SERPL-MCNC: 6.5 G/DL
SODIUM SERPL-SCNC: 140 MMOL/L
TRIGL SERPL-MCNC: 260 MG/DL
TSH SERPL-ACNC: 1.73 UIU/ML
VIT B12 SERPL-MCNC: 562 PG/ML

## 2023-03-27 ENCOUNTER — APPOINTMENT (OUTPATIENT)
Dept: HEMATOLOGY ONCOLOGY | Facility: CLINIC | Age: 58
End: 2023-03-27
Payer: COMMERCIAL

## 2023-03-27 VITALS
OXYGEN SATURATION: 96 % | DIASTOLIC BLOOD PRESSURE: 76 MMHG | SYSTOLIC BLOOD PRESSURE: 112 MMHG | TEMPERATURE: 97.2 F | HEART RATE: 79 BPM | BODY MASS INDEX: 35.59 KG/M2 | WEIGHT: 193.4 LBS | HEIGHT: 62 IN

## 2023-03-27 DIAGNOSIS — M85.852 OTHER SPECIFIED DISORDERS OF BONE DENSITY AND STRUCTURE, LEFT THIGH: ICD-10-CM

## 2023-03-27 LAB — APO LP(A) SERPL-MCNC: <9 NMOL/L

## 2023-03-27 PROCEDURE — 99214 OFFICE O/P EST MOD 30 MIN: CPT

## 2023-03-27 RX ORDER — ESCITALOPRAM OXALATE 20 MG/1
20 TABLET, FILM COATED ORAL DAILY
Refills: 0 | Status: DISCONTINUED | COMMUNITY
End: 2023-03-27

## 2023-03-29 ENCOUNTER — APPOINTMENT (OUTPATIENT)
Dept: CARDIOLOGY | Facility: CLINIC | Age: 58
End: 2023-03-29
Payer: COMMERCIAL

## 2023-03-29 PROCEDURE — 93320 DOPPLER ECHO COMPLETE: CPT

## 2023-03-29 PROCEDURE — 93351 STRESS TTE COMPLETE: CPT

## 2023-04-04 ENCOUNTER — NON-APPOINTMENT (OUTPATIENT)
Age: 58
End: 2023-04-04

## 2023-04-04 ENCOUNTER — APPOINTMENT (OUTPATIENT)
Dept: CARDIOLOGY | Facility: CLINIC | Age: 58
End: 2023-04-04
Payer: COMMERCIAL

## 2023-04-04 PROCEDURE — 93298 REM INTERROG DEV EVAL SCRMS: CPT

## 2023-04-04 PROCEDURE — G2066: CPT

## 2023-04-06 NOTE — PHYSICAL EXAM
Sleep Apnea: Care Instructions  Your Care Instructions    Sleep apnea means that you frequently stop breathing for 10 seconds or longer during sleep. It can be mild to severe, based on the number of times an hour that you stop breathing or have slowed breathing. Blocked or narrowed airways in your nose, mouth, or throat can cause sleep apnea. Your airway can become blocked when your throat muscles and tongue relax during sleep. You can treat sleep apnea at home by making lifestyle changes. You also can use a CPAP breathing machine that keeps tissues in the throat from blocking your airway. Or your doctor may suggest that you use a breathing device while you sleep. It helps keep your airway open. This could be a device that you put in your mouth. In some cases, surgery may be needed to remove enlarged tissues in the throat. Follow-up care is a key part of your treatment and safety. Be sure to make and go to all appointments, and call your doctor if you are having problems. It's also a good idea to know your test results and keep a list of the medicines you take. How can you care for yourself at home? · Lose weight, if needed. It may reduce the number of times you stop breathing or have slowed breathing. · Sleep on your side. It may stop mild apnea. If you tend to roll onto your back, sew a pocket in the back of your pajama top. Put a tennis ball into the pocket, and stitch the pocket shut. This will help keep you from sleeping on your back. · Avoid alcohol and medicines such as sleeping pills and sedatives before bed. · Do not smoke. Smoking can make sleep apnea worse. If you need help quitting, talk to your doctor about stop-smoking programs and medicines. These can increase your chances of quitting for good. · Prop up the head of your bed 4 to 6 inches by putting bricks under the legs of the bed. · Treat breathing problems, such as a stuffy nose, caused by a cold or allergies.   · Try a continuous positive airway pressure (CPAP) breathing machine if your doctor recommends it. The machine keeps your airway open when you sleep. · If CPAP does not work for you, ask your doctor if you can try other breathing machines. A bilevel positive airway pressure machine uses one type of air pressure for breathing in and another type for breathing out. Another device raises or lowers air pressure as needed while you breathe. · Talk to your doctor if:  ? Your nose feels dry or bleeds when you use one of these machines. You may need to increase moisture in the air. A humidifier may help. ? Your nose is runny or stuffy from using a breathing machine. Decongestants or a corticosteroid nasal spray may help. ? You are sleepy during the day and it gets in the way of the normal things you do. Do not drive when you are drowsy. When should you call for help? Watch closely for changes in your health, and be sure to contact your doctor if:    · You still have sleep apnea even though you have made lifestyle changes.     · You are thinking of trying a device such as CPAP.     · You are having problems using a CPAP or similar machine. Where can you learn more? Go to http://tyson-sapna.info/. Enter V286 in the search box to learn more about \"Sleep Apnea: Care Instructions. \"  Current as of: September 5, 2018  Content Version: 11.9  © 4987-5304 [employer]. Care instructions adapted under license by [employer] (which disclaims liability or warranty for this information). If you have questions about a medical condition or this instruction, always ask your healthcare professional. [place] [address] any warranty or liability for your use of this information. Well Visit, Ages 25 to 48: Care Instructions  Your Care Instructions    Physical exams can help you stay healthy.  Your doctor has checked your overall health and may have suggested ways to take good care of yourself. He or she also may have recommended tests. At home, you can help prevent illness with healthy eating, regular exercise, and other steps. Follow-up care is a key part of your treatment and safety. Be sure to make and go to all appointments, and call your doctor if you are having problems. It's also a good idea to know your test results and keep a list of the medicines you take. How can you care for yourself at home? · Reach and stay at a healthy weight. This will lower your risk for many problems, such as obesity, diabetes, heart disease, and high blood pressure. · Get at least 30 minutes of physical activity on most days of the week. Walking is a good choice. You also may want to do other activities, such as running, swimming, cycling, or playing tennis or team sports. Discuss any changes in your exercise program with your doctor. · Do not smoke or allow others to smoke around you. If you need help quitting, talk to your doctor about stop-smoking programs and medicines. These can increase your chances of quitting for good. · Talk to your doctor about whether you have any risk factors for sexually transmitted infections (STIs). Having one sex partner (who does not have STIs and does not have sex with anyone else) is a good way to avoid these infections. · Use birth control if you do not want to have children at this time. Talk with your doctor about the choices available and what might be best for you. · Protect your skin from too much sun. When you're outdoors from 10 a.m. to 4 p.m., stay in the shade or cover up with clothing and a hat with a wide brim. Wear sunglasses that block UV rays. Even when it's cloudy, put broad-spectrum sunscreen (SPF 30 or higher) on any exposed skin. · See a dentist one or two times a year for checkups and to have your teeth cleaned. · Wear a seat belt in the car. · Drink alcohol in moderation, if at all.  That means no more than 2 drinks a day for men and 1 drink a [Restricted in physically strenuous activity but ambulatory and able to carry out work of a light or sedentary nature] : Status 1- Restricted in physically strenuous activity but ambulatory and able to carry out work of a light or sedentary nature, e.g., light house work, office work day for women. Follow your doctor's advice about when to have certain tests. These tests can spot problems early. For everyone  · Cholesterol. Have the fat (cholesterol) in your blood tested after age 21. Your doctor will tell you how often to have this done based on your age, family history, or other things that can increase your risk for heart disease. · Blood pressure. Have your blood pressure checked during a routine doctor visit. Your doctor will tell you how often to check your blood pressure based on your age, your blood pressure results, and other factors. · Vision. Talk with your doctor about how often to have a glaucoma test.  · Diabetes. Ask your doctor whether you should have tests for diabetes. · Colon cancer. Have a test for colon cancer at age 48. You may have one of several tests. If you are younger than 48, you may need a test earlier if you have any risk factors. Risk factors include whether you already had a precancerous polyp removed from your colon or whether your parent, brother, sister, or child has had colon cancer. For women  · Breast exam and mammogram. Talk to your doctor about when you should have a clinical breast exam and a mammogram. Medical experts differ on whether and how often women under 50 should have these tests. Your doctor can help you decide what is right for you. · Pap test and pelvic exam. Begin Pap tests at age 24. A Pap test is the best way to find cervical cancer. The test often is part of a pelvic exam. Ask how often to have this test.  · Tests for sexually transmitted infections (STIs). Ask whether you should have tests for STIs. You may be at risk if you have sex with more than one person, especially if your partners do not wear condoms. For men  · Tests for sexually transmitted infections (STIs). Ask whether you should have tests for STIs. You may be at risk if you have sex with more than one person, especially if you do not wear a condom.   · Testicular [Normal] : clear to auscultation bilaterally, no dullness, no wheezing cancer exam. Ask your doctor whether you should check your testicles regularly. · Prostate exam. Talk to your doctor about whether you should have a blood test (called a PSA test) for prostate cancer. Experts differ on whether and when men should have this test. Some experts suggest it if you are older than 39 and are -American or have a father or brother who got prostate cancer when he was younger than 72. When should you call for help? Watch closely for changes in your health, and be sure to contact your doctor if you have any problems or symptoms that concern you. Where can you learn more? Go to http://tyson-sapna.info/. Enter P072 in the search box to learn more about \"Well Visit, Ages 25 to 48: Care Instructions. \"  Current as of: March 28, 2018  Content Version: 11.9  © 8465-6134 [employer]. Care instructions adapted under license by [employer] (which disclaims liability or warranty for this information). If you have questions about a medical condition or this instruction, always ask your healthcare professional. [place] [address] any warranty or liability for your use of this information. [de-identified] : generally well appearing female, NAD  [de-identified] : s/p sternotomy, keloids present at surgical site  [de-identified] : s/p bilateral mastectomy for LCIS, small nodule in R breast 1-2mm, no palpable LAD (last visit - declined CBE today)

## 2023-04-06 NOTE — HISTORY OF PRESENT ILLNESS
[de-identified] : Referred by: Dr. Medhat Watkins \par Previously followed by [facility] Dr. Oliva\par \par Ms. Martin initially presented at age 56 in May 2022 for evaluation of APLS, history of PE. \par The patient has a medical history of bilateral pleomorphic LCIS s/p bilateral mastectomy  (Dr. Karson Richardson, Dr. Mercedes Cuello), FVL heterozygosity, hereditary hemochromatosis heterozygosity, TIA x 3 (, , ), AAA s/p repair, DVT/PE s/p IVC filter, now on coumadin. \par \par Presenting HPI: Estefania is here to establish care for history of pulmonary embolism (2014 s/p surgical procedure and travelling), and possible antiphospholipid syndrome.  She has previously been following with Dr. Oliva from [facility] for history of factor V Leiden heterozygosity, hereditary hemochromatosis (heterozygosity C282Y), history of TIA, on aspirin, history of DVT/PE.  Following her diagnosis of pulmonary embolism she was initiated on Xarelto which she was tolerating well.  She subsequently developed abdominal pain and was found to have a blood clot in the hepatic vein on imaging and her anticoagulation was switched to Coumadin given concern for failure that area of Xarelto as well as possible antiphospholipid syndrome given persistently elevated IgM anticardiolipin antibody.  In retrospect her imaging was reviewed by Dr. Caro and she did not have a blood clot in the hepatic vein.\par \par Since she has been on Coumadin she has had difficulty maintaining a therapeutic INR.  She was taking 6 mg daily.  She stated she is not feeling well and has become more anemic- required iron infusion recently. She also reports that she has been having episodes of hematuria and is being worked up for possible kidney stones.  She saw Dr. Red from urology and cystoscopy was performed which was normal.  She has had multiple urine cultures which were negative for infection. Her plavix has been discontinued. She also stopped her ASA on 2/2 hematuria which has resolved. Seen for INR check at [facility] - INR 1.8. \par \par Of note, she has a history of bilateral LCIS, s/p bilateral mastectomy. Genetic testing was negative. \par \par Laboratory studies from 5/3/22 reviewed and notable for: INR 1.66\par D-dimer <150, iron studies sufficient, B12/folate sufficient, ferritin 557, WBC 6.29, Hb 12.8, Plt 187\par \par SH: \par - Occupation: works for [employer] as a  \par - Living situation: lives in [place] with her \par - Smoking/etoh/illicits: former smoker, quit at age 25, rare etoh, denies illicits \par - Exercise: not very physically active, previously played ice hockey \par \par FH: \par - Her brother  of pancreatic cancer at age 55\par - Her mother  of breast cancer at age 62, diagnosed at age 55\par - Her sister was diagnosed with DCIS at age 54\par - Father and pAunt had hereditary hemochromatosis \par - Father  of liver cancer at age 72\par \par Eliquis 5mg BID - [facility] [phone] [de-identified] : Estefania is here for follow up on 3/27/23 for hypercoagulable state. \par She was admitted to [facility] with appendicitis - s/p appendectomy 12/2/22. \par \par At today's visit she reports feeling "much better".  She remains on eliquis 5mg BID. She reports a good energy level and appetite.  Following with cardiology - was recently started on a statin, stress test scheduled.  Mood symptoms improving after starting counseling with Laurita Greenwood, also following with Priscilla Shaver NP, for medication management (Lexapro increased, taking Vraylar daily).  She saw Dr. Khushboo Saunders for R breast nodule - aspirated by Dr. Dunham and c/w oil cyst.  She denies fevers, chills, chest pain, shortness of breath, nausea, vomiting, diarrhea, bleeding or bruising. \par \par Labs from 3/23/23 reviewed at today's visit and significant for:  WBC 7.61, hgb 13.9, plt 188\par Ferritin decreased from 469 to 216\par \par Health Care Maintenance: Updated 3/27/23\par Mammogram: no longer screening, s/p bilateral mastectomy \par Colonoscopy:  Colonoscopy with Dr. Sherman- reportedly unremarkable in 1/2022 \par Pap smear: Pap 2/2022, normal, hysterectomy removed in 2017 for fibroids, still has ovaries, fallopian tubes and cervix \par Dentist: UTD\par PCP: Dr. Mila Morris ([facility]), [facility] \par Dermatology screen: seen ~7/22, Dr. Tavares \par Genetic screen: BRCA negative \par DEXA: 8/2019, osteopenia, 3/16/23 - osteopenia with overall decrease\par \par Vaccinations: \par COVID vaccination: s/p 3 doses, also had a recent COVID infection in 9/2022 \par Flu vaccine: declines

## 2023-04-06 NOTE — RESULTS/DATA
[FreeTextEntry1] : Referred by: Dr. Medhat Watkins \par Cardiology: Dr. Amarjit Easton \par [facility]\par \par Ms. Martin initially presented at age 56 in May 2022 for evaluation of APLS, history of PE. \par The patient has a medical history of bilateral pleomorphic LCIS s/p bilateral mastectomy 2012 (Dr. Karson Richardson, Dr. Mercedes Cuello), FVL heterozygosity, hereditary hemochromatosis heterozygosity, TIA x 3 (2017, 2018, 2021), AAA s/p repair, DVT/PE s/p IVC filter, previously on coumadin. \par \par She has multiple risk factors for recurrent clot and should remain on anticoagulation at this time.  She has not been adequately anticoagulated on coumadin- lab studies revealed low/mod risk APLS. Switched to eliquis for more stable anticoagulation at this time. Risks/benefits discussed with the patient in detail. Continue ASA. \par \par PE/ Factor V leiden- continue eliquis 5 mg po BID\par Elevated ferritin- hemochromatosis heterozygous, follow ferritin.  \par Repeat DEXA showed T-score -2.5 with overall decline - discussed at length today

## 2023-04-25 ENCOUNTER — APPOINTMENT (OUTPATIENT)
Dept: CARDIOLOGY | Facility: CLINIC | Age: 58
End: 2023-04-25
Payer: COMMERCIAL

## 2023-04-25 VITALS
SYSTOLIC BLOOD PRESSURE: 122 MMHG | BODY MASS INDEX: 35.33 KG/M2 | DIASTOLIC BLOOD PRESSURE: 76 MMHG | WEIGHT: 192 LBS | HEIGHT: 62 IN | HEART RATE: 65 BPM | OXYGEN SATURATION: 98 %

## 2023-04-25 DIAGNOSIS — Z95.818 PRESENCE OF OTHER CARDIAC IMPLANTS AND GRAFTS: ICD-10-CM

## 2023-04-25 DIAGNOSIS — R79.89 OTHER SPECIFIED ABNORMAL FINDINGS OF BLOOD CHEMISTRY: ICD-10-CM

## 2023-04-25 PROCEDURE — 99214 OFFICE O/P EST MOD 30 MIN: CPT

## 2023-04-25 RX ORDER — ESCITALOPRAM OXALATE 20 MG/1
20 TABLET, FILM COATED ORAL DAILY
Refills: 0 | Status: ACTIVE | COMMUNITY

## 2023-04-26 PROBLEM — R79.89 LOW SERUM LIPOPROTEIN(A): Status: ACTIVE | Noted: 2023-04-26

## 2023-04-26 PROBLEM — Z95.818 STATUS POST PLACEMENT OF IMPLANTABLE LOOP RECORDER: Status: ACTIVE | Noted: 2020-10-10

## 2023-04-29 NOTE — HISTORY OF PRESENT ILLNESS
[FreeTextEntry1] : DAVY PIRES  is a 57 year old  F\par \par with a history of breast ca, HTN, obesity s/p gastric sleeve, hiatal hernia repair June 2017, DVT/PE s/p IVC filter and NOAC, FACUNDO on CPAP, ascending aortic aneurysm repair using a #28 graft with Dr. 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 \par There is no history of symptomatic congestive heart failure or rheumatic heart disease. \par There is no history of symptomatic arrhythmias including atrial fibrillation.\par \par Prior DAYANA without PFO. \par ILR no AF \par \par Follows with hematology.  Antiphospholipid.  Intolerant to Coumadin. \par Takes direct oral anticoagulant also on concomitant aspirin \par  \par She is playing [employer].\par Denies exertional chest pain or discomfort. \par Denies orthopnea, weight gain, or LE edema. \par Denies palpitations, lightheadedness, dizziness, or syncope.  \par Denies any unusual bleeding or black/tarry stools. \par \par Now takes a statin.  There were no adverse effects. \par \par Blood work March 2023 TSH 1.7 BMP within normal limits \par LP(a) within normal limits total cholesterol 195 LDL 92  \par CPK within normal limits creatinine 0.8 A1c 5.0 \par \par stress test March 2023 6 minutes no ischemia \par Loop recorder unremarkable \par Echocardiogram demonstrates normal left ventricular function aortic arch 3.7 cm mild mitral regurgitation.  \par EKG has sinus bradycardia with anterior T wave changes.  \par Nuclear stress test 2/19/21: Lexiscan. No evidence of ischemia by EKG. There is a small, mild defect in apical wall that is fixed, suggestive of infarct. Clinical correlation suggested given hx of gastric sleeve and hiatal hernia. Post stress gated wall motion anaylsis; EF 57%, revealing hypokinesis in apical walls.\par CT scan describes stable exam replacement of ascending aorta no dissection or intramural hematoma \par Carotid Doppler. April 2018. Intimal thickening. \par

## 2023-04-29 NOTE — ASSESSMENT
[FreeTextEntry1] : Echo reviewed.  Monitor aortic dilatation.  \par Follow-up blood work has been requested \par Follow-up device \par Clinical follow-up will be scheduled in 6 months\par Continue anticoagulation.  Follow-up with hematology.  \par Discussed possible removal of implantable loop recorder when at end of service.  \par \par Recurrent TIAs\par History of DVT/PE \par IVC filter\par antiphospholipid \par Prior DAYANA without PFO. \par \par Aortic aneurysm s/p repair 7/15\par Family history of aortic disease\par MR, mild\par Surveillance monitoring \par \par

## 2023-05-09 ENCOUNTER — NON-APPOINTMENT (OUTPATIENT)
Age: 58
End: 2023-05-09

## 2023-05-09 ENCOUNTER — APPOINTMENT (OUTPATIENT)
Dept: CARDIOLOGY | Facility: CLINIC | Age: 58
End: 2023-05-09
Payer: COMMERCIAL

## 2023-05-09 PROCEDURE — 93298 REM INTERROG DEV EVAL SCRMS: CPT

## 2023-05-09 PROCEDURE — G2066: CPT

## 2023-06-13 ENCOUNTER — APPOINTMENT (OUTPATIENT)
Dept: CARDIOLOGY | Facility: CLINIC | Age: 58
End: 2023-06-13
Payer: COMMERCIAL

## 2023-06-13 ENCOUNTER — NON-APPOINTMENT (OUTPATIENT)
Age: 58
End: 2023-06-13

## 2023-06-13 PROCEDURE — 93298 REM INTERROG DEV EVAL SCRMS: CPT

## 2023-06-13 PROCEDURE — G2066: CPT

## 2023-06-19 ENCOUNTER — OUTPATIENT (OUTPATIENT)
Dept: OUTPATIENT SERVICES | Facility: HOSPITAL | Age: 58
LOS: 1 days | End: 2023-06-19
Payer: COMMERCIAL

## 2023-06-19 DIAGNOSIS — Z98.890 OTHER SPECIFIED POSTPROCEDURAL STATES: Chronic | ICD-10-CM

## 2023-06-19 DIAGNOSIS — R59.1 GENERALIZED ENLARGED LYMPH NODES: Chronic | ICD-10-CM

## 2023-06-19 DIAGNOSIS — Z98.89 OTHER SPECIFIED POSTPROCEDURAL STATES: Chronic | ICD-10-CM

## 2023-06-19 DIAGNOSIS — Z90.711 ACQUIRED ABSENCE OF UTERUS WITH REMAINING CERVICAL STUMP: Chronic | ICD-10-CM

## 2023-06-19 DIAGNOSIS — Z90.13 ACQUIRED ABSENCE OF BILATERAL BREASTS AND NIPPLES: Chronic | ICD-10-CM

## 2023-06-19 DIAGNOSIS — Z41.1 ENCOUNTER FOR COSMETIC SURGERY: Chronic | ICD-10-CM

## 2023-06-19 DIAGNOSIS — D50.9 IRON DEFICIENCY ANEMIA, UNSPECIFIED: ICD-10-CM

## 2023-06-19 DIAGNOSIS — Q17.9 CONGENITAL MALFORMATION OF EAR, UNSPECIFIED: Chronic | ICD-10-CM

## 2023-06-23 DIAGNOSIS — R79.89 OTHER SPECIFIED ABNORMAL FINDINGS OF BLOOD CHEMISTRY: ICD-10-CM

## 2023-06-26 ENCOUNTER — RESULT REVIEW (OUTPATIENT)
Age: 58
End: 2023-06-26

## 2023-06-26 ENCOUNTER — APPOINTMENT (OUTPATIENT)
Dept: HEMATOLOGY ONCOLOGY | Facility: CLINIC | Age: 58
End: 2023-06-26
Payer: COMMERCIAL

## 2023-06-26 VITALS
HEART RATE: 60 BPM | WEIGHT: 202 LBS | HEIGHT: 62 IN | OXYGEN SATURATION: 96 % | DIASTOLIC BLOOD PRESSURE: 83 MMHG | TEMPERATURE: 97.9 F | BODY MASS INDEX: 37.17 KG/M2 | SYSTOLIC BLOOD PRESSURE: 135 MMHG

## 2023-06-26 LAB
BASOPHILS # BLD AUTO: 0.04 K/UL — SIGNIFICANT CHANGE UP (ref 0–0.2)
BASOPHILS NFR BLD AUTO: 0.5 % — SIGNIFICANT CHANGE UP (ref 0–2)
EOSINOPHIL # BLD AUTO: 0.12 K/UL — SIGNIFICANT CHANGE UP (ref 0–0.5)
EOSINOPHIL NFR BLD AUTO: 1.4 % — SIGNIFICANT CHANGE UP (ref 0–6)
HCT VFR BLD CALC: 40.1 % — SIGNIFICANT CHANGE UP (ref 34.5–45)
HGB BLD-MCNC: 13.6 G/DL — SIGNIFICANT CHANGE UP (ref 11.5–15.5)
IMM GRANULOCYTES NFR BLD AUTO: 0.4 % — SIGNIFICANT CHANGE UP (ref 0–0.9)
LYMPHOCYTES # BLD AUTO: 1.6 K/UL — SIGNIFICANT CHANGE UP (ref 1–3.3)
LYMPHOCYTES # BLD AUTO: 19.1 % — SIGNIFICANT CHANGE UP (ref 13–44)
MCHC RBC-ENTMCNC: 32.3 PG — SIGNIFICANT CHANGE UP (ref 27–34)
MCHC RBC-ENTMCNC: 33.9 GM/DL — SIGNIFICANT CHANGE UP (ref 32–36)
MCV RBC AUTO: 95.2 FL — SIGNIFICANT CHANGE UP (ref 80–100)
MONOCYTES # BLD AUTO: 0.5 K/UL — SIGNIFICANT CHANGE UP (ref 0–0.9)
MONOCYTES NFR BLD AUTO: 6 % — SIGNIFICANT CHANGE UP (ref 2–14)
NEUTROPHILS # BLD AUTO: 6.1 K/UL — SIGNIFICANT CHANGE UP (ref 1.8–7.4)
NEUTROPHILS NFR BLD AUTO: 72.6 % — SIGNIFICANT CHANGE UP (ref 43–77)
NRBC # BLD: 0 /100 WBCS — SIGNIFICANT CHANGE UP (ref 0–0)
PLATELET # BLD AUTO: 201 K/UL — SIGNIFICANT CHANGE UP (ref 150–400)
RBC # BLD: 4.21 M/UL — SIGNIFICANT CHANGE UP (ref 3.8–5.2)
RBC # FLD: 13.5 % — SIGNIFICANT CHANGE UP (ref 10.3–14.5)
WBC # BLD: 8.39 K/UL — SIGNIFICANT CHANGE UP (ref 3.8–10.5)
WBC # FLD AUTO: 8.39 K/UL — SIGNIFICANT CHANGE UP (ref 3.8–10.5)

## 2023-06-26 PROCEDURE — 99214 OFFICE O/P EST MOD 30 MIN: CPT

## 2023-06-26 PROCEDURE — 85027 COMPLETE CBC AUTOMATED: CPT

## 2023-06-26 NOTE — RESULTS/DATA
[FreeTextEntry1] : Referred by: Dr. Medhat Watkins \par Cardiology: Dr. Amarjit Easton \par [facility] Dr[facility]iva\par \par Ms. Martin initially presented at age 56 in May 2022 for evaluation of APLS, history of PE. \par The patient has a medical history of bilateral pleomorphic LCIS s/p bilateral mastectomy 2012 (Dr. Karson Richardson, Dr. Mercedes Cuello), FVL heterozygosity, hereditary hemochromatosis heterozygosity, TIA x 3 (2017, 2018, 2021), AAA s/p repair, DVT/PE s/p IVC filter, previously on coumadin. \par \par She has multiple risk factors for recurrent clot and should remain on anticoagulation at this time.  She has not been adequately anticoagulated on coumadin- lab studies revealed low/mod risk APLS. Switched to eliquis for more stable anticoagulation at this time. Risks/benefits discussed with the patient in detail. Continue ASA. \par \par PE/ Factor V leiden- continue eliquis 5 mg po BID, remains on ASA daily \par Elevated ferritin- hemochromatosis heterozygous, follow ferritin. \par DEXA 3/2023 revealed osteoporosis- she will obtain dental clearance for zometa. \par All patient questions answered at today's visit. Patient urged to call the office with any questions or concerns.\par RTC 3 months for NP visit + zometa. Lab check 1 week prior.

## 2023-06-26 NOTE — PHYSICAL EXAM
[Restricted in physically strenuous activity but ambulatory and able to carry out work of a light or sedentary nature] : Status 1- Restricted in physically strenuous activity but ambulatory and able to carry out work of a light or sedentary nature, e.g., light house work, office work [Normal] : affect appropriate [de-identified] : generally well appearing female, NAD  [de-identified] : s/p sternotomy, keloids present at surgical site  [de-identified] : s/p bilateral mastectomy for LCIS, small nodule in R breast 1-2mm, no palpable LAD (last visit)

## 2023-06-26 NOTE — HISTORY OF PRESENT ILLNESS
[de-identified] : Referred by: Dr. Medhat Watkins \par Previously followed by [facility] Dr. Oliva\par \par Ms. Martin presented at age 56 in May 2022 for evaluation of APLS, history of PE. \par The patient has a medical history of bilateral pleomorphic LCIS s/p bilateral mastectomy  (Dr. Karson Richardson, Dr. Mercedes Cuello), FVL heterozygosity, hereditary hemochromatosis heterozygosity, TIA x 3 (, , ), AAA s/p repair, DVT/PE s/p IVC filter, now on coumadin. \par \par Presenting HPI: Estefania is here to establish care for history of pulmonary embolism ( s/p surgical procedure and travelling), and possible antiphospholipid syndrome.  She has previously been following with Dr. Oliva from [facility] for history of factor V Leiden heterozygosity, hereditary hemochromatosis (heterozygosity C282Y), history of TIA, on aspirin, history of DVT/PE.  Following her diagnosis of pulmonary embolism she was initiated on Xarelto which she was tolerating well.  She subsequently developed abdominal pain and was found to have a blood clot in the hepatic vein on imaging and her anticoagulation was switched to Coumadin given concern for failure that area of Xarelto as well as possible antiphospholipid syndrome given persistently elevated IgM anticardiolipin antibody.  In retrospect her imaging was reviewed by Dr. Caro and she did not have a blood clot in the hepatic vein.\par \par Since she has been on Coumadin she has had difficulty maintaining a therapeutic INR.  She is currently taking 6 mg daily.  She states she is not feeling well and has become more anemic- required iron infusion recently. She also reports that she has been having episodes of hematuria and is being worked up for possible kidney stones.  She saw Dr. Red from urology and cystoscopy was performed which was normal.  She has had multiple urine cultures which were negative for infection. Her plavix has been discontinued. She also stopped her ASA last Thursday 2/2 hematuria which has resolved. Seen yesterday for INR check at [facility] - INR 1.8. \par \par Of note, she has a history of bilateral LCIS, s/p bilateral mastectomy. Genetic testing was negative. \par \par Laboratory studies from 5/3/22 reviewed and notable for: INR 1.66\par D-dimer <150, iron studies sufficient, B12/folate sufficient, ferritin 557, WBC 6.29, Hb 12.8, Plt 187\par \par SH: \par - Occupation: works for [employer] as a  \par - Living situation: lives in [place] with her \par - Smoking/etoh/illicits: former smoker, quit at age 25, rare etoh, denies illicits \par - Exercise: not very physically active, previously played ice hockey \par \par FH: \par - Her brother  of pancreatic cancer at age 55\par - Her mother  of breast cancer at age 62, diagnosed at age 55\par - Her sister was diagnosed with DCIS at age 54\par - Father and pAunt had hereditary hemochromatosis \par - Father  of liver cancer at age 72\par \par Eliquis 5mg BID -  [phone] [de-identified] : Estefania is here for follow up on 6/26/23 for hypercoagulable state. \par \par At today's visit she is feeling very well. She remains on eliquis 5mg BID and ASA. She recently started statin therapy. She reports a good energy level and appetite. She denies fevers, chills, chest pain, shortness of breath, nausea, vomiting, diarrhea, bleeding or bruising. \par \par Health Care Maintenance: Updated 6/26/23\par Mammogram: no longer screening, s/p bilateral mastectomy \par Colonoscopy:  Colonoscopy with Dr. Sherman- reportedly unremarkable in 1/2022 \par Pap smear: Pap 2/2022, normal, hysterectomy removed in 2017 for fibroids, still has ovaries, fallopian tubes and cervix \par Dentist: UTD\par PCP: Dr. Mila Morris ([facility]), [facility] \par Dermatology screen: UTD, Dr. Tavares \par Genetic screen: BRCA negative \par DEXA: DEXA 3/16/23- osteoporosis \par COVID vaccination: s/p 3 doses, also had a recent COVID infection in 9/2022 \par Flu vaccine: declines

## 2023-06-27 LAB
ALBUMIN SERPL ELPH-MCNC: 4.4 G/DL
ALP BLD-CCNC: 101 U/L
ALT SERPL-CCNC: 14 U/L
ANION GAP SERPL CALC-SCNC: 14 MMOL/L
AST SERPL-CCNC: 18 U/L
BILIRUB SERPL-MCNC: 0.3 MG/DL
BUN SERPL-MCNC: 18 MG/DL
CALCIUM SERPL-MCNC: 9.4 MG/DL
CHLORIDE SERPL-SCNC: 105 MMOL/L
CO2 SERPL-SCNC: 21 MMOL/L
CREAT SERPL-MCNC: 0.91 MG/DL
EGFR: 74 ML/MIN/1.73M2
FERRITIN SERPL-MCNC: 230 NG/ML
FOLATE SERPL-MCNC: 17.2 NG/ML
GLUCOSE SERPL-MCNC: 138 MG/DL
POTASSIUM SERPL-SCNC: 4.4 MMOL/L
PROT SERPL-MCNC: 6.6 G/DL
SODIUM SERPL-SCNC: 140 MMOL/L
VIT B12 SERPL-MCNC: 456 PG/ML

## 2023-07-18 ENCOUNTER — APPOINTMENT (OUTPATIENT)
Dept: CARDIOLOGY | Facility: CLINIC | Age: 58
End: 2023-07-18
Payer: COMMERCIAL

## 2023-07-18 ENCOUNTER — NON-APPOINTMENT (OUTPATIENT)
Age: 58
End: 2023-07-18

## 2023-07-18 PROCEDURE — G2066: CPT

## 2023-07-18 PROCEDURE — 93298 REM INTERROG DEV EVAL SCRMS: CPT

## 2023-08-22 ENCOUNTER — NON-APPOINTMENT (OUTPATIENT)
Age: 58
End: 2023-08-22

## 2023-08-22 ENCOUNTER — APPOINTMENT (OUTPATIENT)
Dept: CARDIOLOGY | Facility: CLINIC | Age: 58
End: 2023-08-22
Payer: COMMERCIAL

## 2023-08-22 PROCEDURE — G2066: CPT

## 2023-08-22 PROCEDURE — 93298 REM INTERROG DEV EVAL SCRMS: CPT

## 2023-09-12 ENCOUNTER — OUTPATIENT (OUTPATIENT)
Dept: OUTPATIENT SERVICES | Facility: HOSPITAL | Age: 58
LOS: 1 days | End: 2023-09-12
Payer: COMMERCIAL

## 2023-09-12 DIAGNOSIS — Z98.89 OTHER SPECIFIED POSTPROCEDURAL STATES: Chronic | ICD-10-CM

## 2023-09-12 DIAGNOSIS — Q17.9 CONGENITAL MALFORMATION OF EAR, UNSPECIFIED: Chronic | ICD-10-CM

## 2023-09-12 DIAGNOSIS — Z98.890 OTHER SPECIFIED POSTPROCEDURAL STATES: Chronic | ICD-10-CM

## 2023-09-12 DIAGNOSIS — Z90.711 ACQUIRED ABSENCE OF UTERUS WITH REMAINING CERVICAL STUMP: Chronic | ICD-10-CM

## 2023-09-12 DIAGNOSIS — R59.1 GENERALIZED ENLARGED LYMPH NODES: Chronic | ICD-10-CM

## 2023-09-12 DIAGNOSIS — Z90.13 ACQUIRED ABSENCE OF BILATERAL BREASTS AND NIPPLES: Chronic | ICD-10-CM

## 2023-09-12 DIAGNOSIS — Z41.1 ENCOUNTER FOR COSMETIC SURGERY: Chronic | ICD-10-CM

## 2023-09-12 DIAGNOSIS — D50.9 IRON DEFICIENCY ANEMIA, UNSPECIFIED: ICD-10-CM

## 2023-09-20 ENCOUNTER — APPOINTMENT (OUTPATIENT)
Dept: HEMATOLOGY ONCOLOGY | Facility: CLINIC | Age: 58
End: 2023-09-20

## 2023-09-20 ENCOUNTER — RESULT REVIEW (OUTPATIENT)
Age: 58
End: 2023-09-20

## 2023-09-20 LAB
BASOPHILS # BLD AUTO: 0.06 K/UL — SIGNIFICANT CHANGE UP (ref 0–0.2)
BASOPHILS NFR BLD AUTO: 0.7 % — SIGNIFICANT CHANGE UP (ref 0–2)
EOSINOPHIL # BLD AUTO: 0.19 K/UL — SIGNIFICANT CHANGE UP (ref 0–0.5)
EOSINOPHIL NFR BLD AUTO: 2.2 % — SIGNIFICANT CHANGE UP (ref 0–6)
HCT VFR BLD CALC: 42.1 % — SIGNIFICANT CHANGE UP (ref 34.5–45)
HGB BLD-MCNC: 14 G/DL — SIGNIFICANT CHANGE UP (ref 11.5–15.5)
IMM GRANULOCYTES NFR BLD AUTO: 0.3 % — SIGNIFICANT CHANGE UP (ref 0–0.9)
LYMPHOCYTES # BLD AUTO: 2.39 K/UL — SIGNIFICANT CHANGE UP (ref 1–3.3)
LYMPHOCYTES # BLD AUTO: 27.1 % — SIGNIFICANT CHANGE UP (ref 13–44)
MCHC RBC-ENTMCNC: 31.5 PG — SIGNIFICANT CHANGE UP (ref 27–34)
MCHC RBC-ENTMCNC: 33.3 GM/DL — SIGNIFICANT CHANGE UP (ref 32–36)
MCV RBC AUTO: 94.6 FL — SIGNIFICANT CHANGE UP (ref 80–100)
MONOCYTES # BLD AUTO: 0.55 K/UL — SIGNIFICANT CHANGE UP (ref 0–0.9)
MONOCYTES NFR BLD AUTO: 6.2 % — SIGNIFICANT CHANGE UP (ref 2–14)
NEUTROPHILS # BLD AUTO: 5.59 K/UL — SIGNIFICANT CHANGE UP (ref 1.8–7.4)
NEUTROPHILS NFR BLD AUTO: 63.5 % — SIGNIFICANT CHANGE UP (ref 43–77)
NRBC # BLD: 0 /100 WBCS — SIGNIFICANT CHANGE UP (ref 0–0)
PLATELET # BLD AUTO: 185 K/UL — SIGNIFICANT CHANGE UP (ref 150–400)
RBC # BLD: 4.45 M/UL — SIGNIFICANT CHANGE UP (ref 3.8–5.2)
RBC # FLD: 13.3 % — SIGNIFICANT CHANGE UP (ref 10.3–14.5)
WBC # BLD: 8.81 K/UL — SIGNIFICANT CHANGE UP (ref 3.8–10.5)
WBC # FLD AUTO: 8.81 K/UL — SIGNIFICANT CHANGE UP (ref 3.8–10.5)

## 2023-09-21 LAB
25(OH)D3 SERPL-MCNC: 31.8 NG/ML
ALBUMIN SERPL ELPH-MCNC: 4.8 G/DL
ALP BLD-CCNC: 104 U/L
ALT SERPL-CCNC: 12 U/L
ANION GAP SERPL CALC-SCNC: 19 MMOL/L
AST SERPL-CCNC: 19 U/L
BILIRUB SERPL-MCNC: 0.4 MG/DL
BUN SERPL-MCNC: 16 MG/DL
CALCIUM SERPL-MCNC: 9.7 MG/DL
CHLORIDE SERPL-SCNC: 107 MMOL/L
CO2 SERPL-SCNC: 19 MMOL/L
CREAT SERPL-MCNC: 0.75 MG/DL
EGFR: 93 ML/MIN/1.73M2
GLUCOSE SERPL-MCNC: 113 MG/DL
POTASSIUM SERPL-SCNC: 4.9 MMOL/L
PROT SERPL-MCNC: 6.9 G/DL
SODIUM SERPL-SCNC: 145 MMOL/L

## 2023-09-25 ENCOUNTER — APPOINTMENT (OUTPATIENT)
Dept: ORTHOPEDIC SURGERY | Facility: CLINIC | Age: 58
End: 2023-09-25
Payer: COMMERCIAL

## 2023-09-25 DIAGNOSIS — Z96.611 PRESENCE OF RIGHT ARTIFICIAL SHOULDER JOINT: ICD-10-CM

## 2023-09-25 DIAGNOSIS — M75.51 BURSITIS OF RIGHT SHOULDER: ICD-10-CM

## 2023-09-25 PROCEDURE — 99213 OFFICE O/P EST LOW 20 MIN: CPT

## 2023-09-25 PROCEDURE — 73030 X-RAY EXAM OF SHOULDER: CPT | Mod: RT

## 2023-09-26 ENCOUNTER — NON-APPOINTMENT (OUTPATIENT)
Age: 58
End: 2023-09-26

## 2023-09-26 ENCOUNTER — APPOINTMENT (OUTPATIENT)
Dept: CARDIOLOGY | Facility: CLINIC | Age: 58
End: 2023-09-26
Payer: COMMERCIAL

## 2023-09-26 PROCEDURE — G2066: CPT

## 2023-09-26 PROCEDURE — 93298 REM INTERROG DEV EVAL SCRMS: CPT

## 2023-09-27 ENCOUNTER — APPOINTMENT (OUTPATIENT)
Dept: HEMATOLOGY ONCOLOGY | Facility: CLINIC | Age: 58
End: 2023-09-27
Payer: COMMERCIAL

## 2023-09-27 ENCOUNTER — NON-APPOINTMENT (OUTPATIENT)
Age: 58
End: 2023-09-27

## 2023-09-27 ENCOUNTER — APPOINTMENT (OUTPATIENT)
Dept: INFUSION THERAPY | Facility: CANCER CENTER | Age: 58
End: 2023-09-27

## 2023-09-27 VITALS
WEIGHT: 197 LBS | RESPIRATION RATE: 16 BRPM | OXYGEN SATURATION: 99 % | HEIGHT: 62 IN | HEART RATE: 62 BPM | BODY MASS INDEX: 36.25 KG/M2 | DIASTOLIC BLOOD PRESSURE: 84 MMHG | TEMPERATURE: 97.2 F | SYSTOLIC BLOOD PRESSURE: 133 MMHG

## 2023-09-27 PROCEDURE — 36591 DRAW BLOOD OFF VENOUS DEVICE: CPT

## 2023-09-27 PROCEDURE — 85027 COMPLETE CBC AUTOMATED: CPT

## 2023-09-27 PROCEDURE — 99214 OFFICE O/P EST MOD 30 MIN: CPT

## 2023-09-27 PROCEDURE — 96365 THER/PROPH/DIAG IV INF INIT: CPT

## 2023-10-24 ENCOUNTER — APPOINTMENT (OUTPATIENT)
Dept: CARDIOLOGY | Facility: CLINIC | Age: 58
End: 2023-10-24
Payer: COMMERCIAL

## 2023-10-24 VITALS
BODY MASS INDEX: 36.25 KG/M2 | DIASTOLIC BLOOD PRESSURE: 74 MMHG | OXYGEN SATURATION: 96 % | WEIGHT: 197 LBS | HEIGHT: 62 IN | SYSTOLIC BLOOD PRESSURE: 116 MMHG | HEART RATE: 59 BPM

## 2023-10-24 DIAGNOSIS — R94.31 ABNORMAL ELECTROCARDIOGRAM [ECG] [EKG]: ICD-10-CM

## 2023-10-24 PROCEDURE — 99214 OFFICE O/P EST MOD 30 MIN: CPT

## 2023-10-24 RX ORDER — ESCITALOPRAM OXALATE 10 MG/1
10 TABLET, FILM COATED ORAL DAILY
Refills: 0 | Status: DISCONTINUED | COMMUNITY
End: 2023-10-24

## 2023-10-31 ENCOUNTER — NON-APPOINTMENT (OUTPATIENT)
Age: 58
End: 2023-10-31

## 2023-10-31 ENCOUNTER — APPOINTMENT (OUTPATIENT)
Dept: CARDIOLOGY | Facility: CLINIC | Age: 58
End: 2023-10-31
Payer: COMMERCIAL

## 2023-10-31 PROCEDURE — G2066: CPT | Mod: NC

## 2023-10-31 PROCEDURE — 93298 REM INTERROG DEV EVAL SCRMS: CPT | Mod: NC

## 2023-12-05 ENCOUNTER — APPOINTMENT (OUTPATIENT)
Dept: CARDIOLOGY | Facility: CLINIC | Age: 58
End: 2023-12-05
Payer: COMMERCIAL

## 2023-12-05 ENCOUNTER — NON-APPOINTMENT (OUTPATIENT)
Age: 58
End: 2023-12-05

## 2023-12-05 PROCEDURE — G2066: CPT

## 2023-12-05 PROCEDURE — 93298 REM INTERROG DEV EVAL SCRMS: CPT

## 2023-12-22 ENCOUNTER — APPOINTMENT (OUTPATIENT)
Dept: INFUSION THERAPY | Facility: CANCER CENTER | Age: 58
End: 2023-12-22

## 2023-12-22 ENCOUNTER — APPOINTMENT (OUTPATIENT)
Dept: HEMATOLOGY ONCOLOGY | Facility: CLINIC | Age: 58
End: 2023-12-22

## 2024-01-08 ENCOUNTER — OUTPATIENT (OUTPATIENT)
Dept: OUTPATIENT SERVICES | Facility: HOSPITAL | Age: 59
LOS: 1 days | End: 2024-01-08
Payer: COMMERCIAL

## 2024-01-08 DIAGNOSIS — Z98.89 OTHER SPECIFIED POSTPROCEDURAL STATES: Chronic | ICD-10-CM

## 2024-01-08 DIAGNOSIS — D50.9 IRON DEFICIENCY ANEMIA, UNSPECIFIED: ICD-10-CM

## 2024-01-08 DIAGNOSIS — Z90.711 ACQUIRED ABSENCE OF UTERUS WITH REMAINING CERVICAL STUMP: Chronic | ICD-10-CM

## 2024-01-08 DIAGNOSIS — Z41.1 ENCOUNTER FOR COSMETIC SURGERY: Chronic | ICD-10-CM

## 2024-01-08 DIAGNOSIS — Z90.13 ACQUIRED ABSENCE OF BILATERAL BREASTS AND NIPPLES: Chronic | ICD-10-CM

## 2024-01-08 DIAGNOSIS — R59.1 GENERALIZED ENLARGED LYMPH NODES: Chronic | ICD-10-CM

## 2024-01-08 DIAGNOSIS — Q17.9 CONGENITAL MALFORMATION OF EAR, UNSPECIFIED: Chronic | ICD-10-CM

## 2024-01-08 DIAGNOSIS — Z98.890 OTHER SPECIFIED POSTPROCEDURAL STATES: Chronic | ICD-10-CM

## 2024-01-08 NOTE — ASSESSMENT
Message left for patient to schedule annual exam to receive any additional refills.  Message also left on voicemail to inform pharmacy.   [FreeTextEntry1] : 55-year-old female well-known to me status post sleeve gastrectomy and hiatal hernia repair.  She is doing well in regards to her weight which has been stable but is in the work-up for her cardiac and/or neurologic problems.  They recommended a transesophageal echo and would like to know our opinion.  We will obtain an upper GI barium swallow as well as an upper endoscopy.  At that point we will make a decision on whether or not there is a significant risk of plaque passing blindly this transesophageal echo.  If it is necessary I would rather have this done at [facility].  We will be reaching out to her cardiologist Dr. Amarjit Arzate.  All of her questions were answered

## 2024-01-09 ENCOUNTER — NON-APPOINTMENT (OUTPATIENT)
Age: 59
End: 2024-01-09

## 2024-01-09 ENCOUNTER — APPOINTMENT (OUTPATIENT)
Dept: CARDIOLOGY | Facility: CLINIC | Age: 59
End: 2024-01-09
Payer: COMMERCIAL

## 2024-01-09 PROCEDURE — 93298 REM INTERROG DEV EVAL SCRMS: CPT

## 2024-01-11 ENCOUNTER — RESULT REVIEW (OUTPATIENT)
Age: 59
End: 2024-01-11

## 2024-01-11 ENCOUNTER — APPOINTMENT (OUTPATIENT)
Dept: HEMATOLOGY ONCOLOGY | Facility: CLINIC | Age: 59
End: 2024-01-11
Payer: COMMERCIAL

## 2024-01-11 VITALS
WEIGHT: 200 LBS | SYSTOLIC BLOOD PRESSURE: 124 MMHG | OXYGEN SATURATION: 96 % | DIASTOLIC BLOOD PRESSURE: 82 MMHG | BODY MASS INDEX: 36.8 KG/M2 | TEMPERATURE: 98.3 F | HEIGHT: 62 IN | HEART RATE: 60 BPM

## 2024-01-11 DIAGNOSIS — N63.10 UNSPECIFIED LUMP IN THE RIGHT BREAST, UNSPECIFIED QUADRANT: ICD-10-CM

## 2024-01-11 LAB
BASOPHILS # BLD AUTO: 0.04 K/UL — SIGNIFICANT CHANGE UP (ref 0–0.2)
BASOPHILS # BLD AUTO: 0.04 K/UL — SIGNIFICANT CHANGE UP (ref 0–0.2)
BASOPHILS NFR BLD AUTO: 0.5 % — SIGNIFICANT CHANGE UP (ref 0–2)
BASOPHILS NFR BLD AUTO: 0.5 % — SIGNIFICANT CHANGE UP (ref 0–2)
EOSINOPHIL # BLD AUTO: 0.11 K/UL — SIGNIFICANT CHANGE UP (ref 0–0.5)
EOSINOPHIL # BLD AUTO: 0.11 K/UL — SIGNIFICANT CHANGE UP (ref 0–0.5)
EOSINOPHIL NFR BLD AUTO: 1.4 % — SIGNIFICANT CHANGE UP (ref 0–6)
EOSINOPHIL NFR BLD AUTO: 1.4 % — SIGNIFICANT CHANGE UP (ref 0–6)
HCT VFR BLD CALC: 43.6 % — SIGNIFICANT CHANGE UP (ref 34.5–45)
HCT VFR BLD CALC: 43.6 % — SIGNIFICANT CHANGE UP (ref 34.5–45)
HGB BLD-MCNC: 14.5 G/DL — SIGNIFICANT CHANGE UP (ref 11.5–15.5)
HGB BLD-MCNC: 14.5 G/DL — SIGNIFICANT CHANGE UP (ref 11.5–15.5)
IMM GRANULOCYTES NFR BLD AUTO: 0.1 % — SIGNIFICANT CHANGE UP (ref 0–0.9)
IMM GRANULOCYTES NFR BLD AUTO: 0.1 % — SIGNIFICANT CHANGE UP (ref 0–0.9)
LYMPHOCYTES # BLD AUTO: 1.42 K/UL — SIGNIFICANT CHANGE UP (ref 1–3.3)
LYMPHOCYTES # BLD AUTO: 1.42 K/UL — SIGNIFICANT CHANGE UP (ref 1–3.3)
LYMPHOCYTES # BLD AUTO: 18.4 % — SIGNIFICANT CHANGE UP (ref 13–44)
LYMPHOCYTES # BLD AUTO: 18.4 % — SIGNIFICANT CHANGE UP (ref 13–44)
MCHC RBC-ENTMCNC: 30.6 PG — SIGNIFICANT CHANGE UP (ref 27–34)
MCHC RBC-ENTMCNC: 30.6 PG — SIGNIFICANT CHANGE UP (ref 27–34)
MCHC RBC-ENTMCNC: 33.3 GM/DL — SIGNIFICANT CHANGE UP (ref 32–36)
MCHC RBC-ENTMCNC: 33.3 GM/DL — SIGNIFICANT CHANGE UP (ref 32–36)
MCV RBC AUTO: 92 FL — SIGNIFICANT CHANGE UP (ref 80–100)
MCV RBC AUTO: 92 FL — SIGNIFICANT CHANGE UP (ref 80–100)
MONOCYTES # BLD AUTO: 0.36 K/UL — SIGNIFICANT CHANGE UP (ref 0–0.9)
MONOCYTES # BLD AUTO: 0.36 K/UL — SIGNIFICANT CHANGE UP (ref 0–0.9)
MONOCYTES NFR BLD AUTO: 4.7 % — SIGNIFICANT CHANGE UP (ref 2–14)
MONOCYTES NFR BLD AUTO: 4.7 % — SIGNIFICANT CHANGE UP (ref 2–14)
NEUTROPHILS # BLD AUTO: 5.76 K/UL — SIGNIFICANT CHANGE UP (ref 1.8–7.4)
NEUTROPHILS # BLD AUTO: 5.76 K/UL — SIGNIFICANT CHANGE UP (ref 1.8–7.4)
NEUTROPHILS NFR BLD AUTO: 74.9 % — SIGNIFICANT CHANGE UP (ref 43–77)
NEUTROPHILS NFR BLD AUTO: 74.9 % — SIGNIFICANT CHANGE UP (ref 43–77)
NRBC # BLD: 0 /100 WBCS — SIGNIFICANT CHANGE UP (ref 0–0)
NRBC # BLD: 0 /100 WBCS — SIGNIFICANT CHANGE UP (ref 0–0)
PLATELET # BLD AUTO: 179 K/UL — SIGNIFICANT CHANGE UP (ref 150–400)
PLATELET # BLD AUTO: 179 K/UL — SIGNIFICANT CHANGE UP (ref 150–400)
RBC # BLD: 4.74 M/UL — SIGNIFICANT CHANGE UP (ref 3.8–5.2)
RBC # BLD: 4.74 M/UL — SIGNIFICANT CHANGE UP (ref 3.8–5.2)
RBC # FLD: 13.5 % — SIGNIFICANT CHANGE UP (ref 10.3–14.5)
RBC # FLD: 13.5 % — SIGNIFICANT CHANGE UP (ref 10.3–14.5)
WBC # BLD: 7.7 K/UL — SIGNIFICANT CHANGE UP (ref 3.8–10.5)
WBC # BLD: 7.7 K/UL — SIGNIFICANT CHANGE UP (ref 3.8–10.5)
WBC # FLD AUTO: 7.7 K/UL — SIGNIFICANT CHANGE UP (ref 3.8–10.5)
WBC # FLD AUTO: 7.7 K/UL — SIGNIFICANT CHANGE UP (ref 3.8–10.5)

## 2024-01-11 PROCEDURE — 85027 COMPLETE CBC AUTOMATED: CPT

## 2024-01-11 PROCEDURE — 99214 OFFICE O/P EST MOD 30 MIN: CPT

## 2024-01-11 RX ORDER — CARIPRAZINE 1.5 MG/1
1.5 CAPSULE, GELATIN COATED ORAL DAILY
Refills: 0 | Status: DISCONTINUED | COMMUNITY
End: 2024-01-11

## 2024-01-12 PROBLEM — N63.10 BREAST MASS, RIGHT: Status: ACTIVE | Noted: 2022-10-07

## 2024-01-12 LAB
ALBUMIN SERPL ELPH-MCNC: 4.7 G/DL
ALP BLD-CCNC: 79 U/L
ALT SERPL-CCNC: 14 U/L
ANION GAP SERPL CALC-SCNC: 17 MMOL/L
AST SERPL-CCNC: 20 U/L
BILIRUB SERPL-MCNC: 0.7 MG/DL
BUN SERPL-MCNC: 16 MG/DL
CALCIUM SERPL-MCNC: 9.8 MG/DL
CHLORIDE SERPL-SCNC: 104 MMOL/L
CO2 SERPL-SCNC: 19 MMOL/L
CREAT SERPL-MCNC: 0.82 MG/DL
EGFR: 83 ML/MIN/1.73M2
FERRITIN SERPL-MCNC: 213 NG/ML
FOLATE SERPL-MCNC: >20 NG/ML
GLUCOSE SERPL-MCNC: 169 MG/DL
IRON SATN MFR SERPL: 23 %
IRON SERPL-MCNC: 74 UG/DL
POTASSIUM SERPL-SCNC: 3.9 MMOL/L
PROT SERPL-MCNC: 7 G/DL
SODIUM SERPL-SCNC: 140 MMOL/L
TIBC SERPL-MCNC: 328 UG/DL
UIBC SERPL-MCNC: 254 UG/DL
VIT B12 SERPL-MCNC: 565 PG/ML

## 2024-01-15 NOTE — ASSESSMENT
[FreeTextEntry1] : Recent blood work has been reviewed.   recent device checks have been reviewed  it is notable her recent blood work was nonfasting  follow-up fasting blood work has been requested Follow-up ultrasound imaging has been requested.   Monitor aortic dilatation. Follow-up device Continue anticoagulation. Follow-up with hematology. Discussed removal of implantable loop recorder when at end of service.  Recurrent TIAs History of DVT/PE IVC filter antiphospholipid Prior DAYANA without PFO.  Aortic aneurysm s/p repair 7/15 Family history of aortic disease MR,mild Surveillance monitoring

## 2024-01-16 ENCOUNTER — NON-APPOINTMENT (OUTPATIENT)
Age: 59
End: 2024-01-16

## 2024-01-16 ENCOUNTER — APPOINTMENT (OUTPATIENT)
Dept: CARDIOLOGY | Facility: CLINIC | Age: 59
End: 2024-01-16
Payer: COMMERCIAL

## 2024-01-16 VITALS
OXYGEN SATURATION: 95 % | BODY MASS INDEX: 37.14 KG/M2 | HEIGHT: 62.5 IN | WEIGHT: 207 LBS | SYSTOLIC BLOOD PRESSURE: 134 MMHG | DIASTOLIC BLOOD PRESSURE: 88 MMHG | HEART RATE: 70 BPM

## 2024-01-16 DIAGNOSIS — E78.2 MIXED HYPERLIPIDEMIA: ICD-10-CM

## 2024-01-16 DIAGNOSIS — I34.0 NONRHEUMATIC MITRAL (VALVE) INSUFFICIENCY: ICD-10-CM

## 2024-01-16 DIAGNOSIS — R94.31 ABNORMAL ELECTROCARDIOGRAM [ECG] [EKG]: ICD-10-CM

## 2024-01-16 PROCEDURE — 93000 ELECTROCARDIOGRAM COMPLETE: CPT

## 2024-01-16 PROCEDURE — 99214 OFFICE O/P EST MOD 30 MIN: CPT | Mod: 25

## 2024-01-16 RX ORDER — ROSUVASTATIN CALCIUM 5 MG/1
5 TABLET, FILM COATED ORAL
Qty: 90 | Refills: 3 | Status: ACTIVE | COMMUNITY
Start: 2023-03-24 | End: 1900-01-01

## 2024-01-16 NOTE — RESULTS/DATA
[FreeTextEntry1] : Referred by: Dr. Medhat Watkins  Cardiology: Dr. Amarjit Easton  Previously followed by [facility] Dr. Oliva  Ms. Martin initially presented at age 56 in May 2022 for evaluation of APLS, history of PE.  The patient has a medical history of bilateral pleomorphic LCIS s/p bilateral mastectomy 2012 (Dr. Karson Richardson, Dr. Mercedes Cuello), FVL heterozygosity, hereditary hemochromatosis heterozygosity, TIA x 3 (2017, 2018, 2021), AAA s/p repair, DVT/PE s/p IVC filter, previously on coumadin.   She has multiple risk factors for recurrent clot and should remain on anticoagulation indefinitely.  She has not been adequately anticoagulated on coumadin- lab studies revealed low/mod risk APLS. Switched to eliquis for more stable anticoagulation at this time. Risks/benefits discussed with the patient in detail. Continue ASA.   PE/ Factor V leiden- continue eliquis 5 mg po BID, remains on ASA daily  Elevated ferritin- hemochromatosis heterozygous, follow ferritin.  DEXA 3/2023 revealed osteoporosis- received initial Zometa 9/27/23 w/ poor tolerance d/t body aches, swelling of knee Unsure if she will continue therapy, will consider Writer to consult w/ Dr. Bustamante pending hematologic clearance for upcoming surgery- can hold eliquis 48 hours prior to surgery, no need for bridging, heme clearance note writte by Dr. Bustamante  Will f/u w/ Dr. Bustamante in 3-4 months, sooner prn All patient questions answered at today's visit. Patient urged to call the office with any questions or concerns.

## 2024-01-16 NOTE — PHYSICAL EXAM
[Restricted in physically strenuous activity but ambulatory and able to carry out work of a light or sedentary nature] : Status 1- Restricted in physically strenuous activity but ambulatory and able to carry out work of a light or sedentary nature, e.g., light house work, office work [Normal] : affect appropriate [de-identified] : generally well appearing female, NAD  [de-identified] : s/p sternotomy, keloids present at surgical site  [de-identified] : s/p bilateral mastectomy for LCIS with LORENZO reconstruction; small nodule in R breast difficult to palpate, no palpable LAD (last visit)  [de-identified] : Soft, non-tender, non-distended,

## 2024-01-16 NOTE — HISTORY OF PRESENT ILLNESS
[de-identified] : Referred by: Dr. Medhat Watkins  Previously followed by [facility] Dr. Oliva  Ms. Martin presented at age 56 in May 2022 for evaluation of APLS, history of PE.  The patient has a medical history of bilateral pleomorphic LCIS s/p bilateral mastectomy  (Dr. Karson Richardson, Dr. Mercedes Cuello), FVL heterozygosity, hereditary hemochromatosis heterozygosity, TIA x 3 (, , ), AAA s/p repair, DVT/PE s/p IVC filter, now on coumadin.   Presenting HPI: Estefania presented to [facility] care for history of pulmonary embolism ( s/p surgical procedure and travelling), and possible antiphospholipid syndrome.  She has previously been following with Dr. Oliva from [facility] for history of factor V Leiden heterozygosity, hereditary hemochromatosis (heterozygosity C282Y), history of TIA, on aspirin, history of DVT/PE.  Following her diagnosis of pulmonary embolism she was initiated on Xarelto which she was tolerating well.  She subsequently developed abdominal pain and was found to have a blood clot in the hepatic vein on imaging and her anticoagulation was switched to Coumadin given concern for failure that area of Xarelto as well as possible antiphospholipid syndrome given persistently elevated IgM anticardiolipin antibody.  In retrospect her imaging was reviewed by Dr. Caro and she did not have a blood clot in the hepatic vein.  Since she had been on Coumadin she had difficulty maintaining a therapeutic INR.  She was taking 6 mg daily.  She states she is not feeling well and has become more anemic- required iron infusion recently. She also reported that she had been having episodes of hematuria and was being worked up for possible kidney stones.  She saw Dr. Red from urology and cystoscopy was performed which was normal.  She had had multiple urine cultures which were negative for infection. Her plavix has been discontinued. She also stopped her ASA last Thursday 2/2 hematuria which has resolved.  Had been recently seen at [facility] for INR check at [facility] - INR 1.8.   Of note, she reported a history of bilateral LCIS, s/p bilateral mastectomy. Genetic testing was negative.   Laboratory studies from 5/3/22 reviewed and notable for: INR 1.66 D-dimer <150, iron studies sufficient, B12/folate sufficient, ferritin 557, WBC 6.29, Hb 12.8, Plt 187  SH:  - Occupation: works for [employer] in [place] as a   - Living situation: lives in [place] with her  - Smoking/etoh/illicits: former smoker, quit at age 25, rare etoh, denies illicits  - Exercise: not very physically active, previously played ice hockey   FH:  - Her brother  of pancreatic cancer at age 55 - Her mother  of breast cancer at age 62, diagnosed at age 55 - Her sister was diagnosed with DCIS at age 54 - Father and pAunt had hereditary hemochromatosis  - Father  of liver cancer at age 72  Eliquis 5mg BID - [facility] [phone] [de-identified] : Estefania is here for follow up on 1/11/24 for hypercoagulable state.   At today's visit she is feeling very well and enjoying [facility]. She remains on eliquis 5mg BID and ASA.  She did not tolerate her initial dose of Zometa well - experienced significant body aches and swelling of the right knee.  She is not sure she wants to continue therapy, will consider.  She is preparing for excision of hypertrophic scar of the abdomen on 1/26/24 with Dr. Scott - will need hematologic clearance and instructions on managing Eliquis around surgery.  She denies fevers, chills, chest pain, shortness of breath, nausea, vomiting, diarrhea, bleeding or bruising.   Health Care Maintenance: Updated 1/11/24 Mammogram: no longer screening, s/p bilateral mastectomy  Colonoscopy:  Colonoscopy with Dr. Sherman- reportedly unremarkable in 1/2022  Pap smear: Pap 2/2022, normal, hysterectomy in 2017 for fibroids, still has ovaries, fallopian tubes and cervix  Dentist: [facility] PCP: Dr. Mila Morris ([facility]), UTD  Dermatology screen: UTD, Dr. Tavares  Genetic screen: BRCA negative  DEXA: DEXA 3/16/23- osteoporosis  COVID vaccination: s/p 3 doses, also had a recent COVID infection in 9/2022  Flu vaccine: declines

## 2024-01-17 NOTE — HISTORY OF PRESENT ILLNESS
[FreeTextEntry1] : DAVY PIRES  is a 58 year old  F  with a history of breast ca, HTN, obesity s/p gastric sleeve, hiatal hernia repair June 2017, DVT/PE s/p IVC filter and NOAC, FACUNDO on CPAP, ascending aortic aneurysm repair using a #28 graft with Dr. Guerrero at [facility], HH, factor V carrier, recurrent TIAs, s/p ILR 9/20, antiphospholipid syndrome.   There is no prior history of coronary revascularization.  There is no history of symptomatic congestive heart failure or rheumatic heart disease.  There is no history of symptomatic arrhythmias including atrial fibrillation.  Prior DAYANA without PFO.  ILR no AF  Follows with hematology.  Antiphospholipid.  Intolerant to Coumadin.  Takes direct oral anticoagulant also on concomitant aspirin    She is playing [employer]. Denies exertional chest pain or discomfort.  Denies orthopnea, weight gain, or LE edema.  Denies palpitations, lightheadedness, dizziness, or syncope.   Denies any unusual bleeding or black/tarry stools.  She now requires a breast revision surgery.  Has seen hematology for preop guidance re AC.  There has been no recent illness or hospitalization.   EKG 1/16/24 SB nonspecific ST-T wave abnormalities (TWI V2 V3 unchanged from prior)  Last blood work has a creatinine 0.7 total cholesterol 123 LDL 32 CPK 84  LP(a) within normal limits  stress test March 2023: 6 minutes no ischemia  Loop recorder unremarkable  Echocardiogram 3/2023 demonstrates normal left ventricular function aortic arch 3.7 cm mild mitral regurgitation.   Nuclear stress test 2/19/21: Lexiscan. No evidence of ischemia by EKG. There is a small, mild defect in apical wall that is fixed, suggestive of infarct. Clinical correlation suggested given hx of gastric sleeve and hiatal hernia. Post stress gated wall motion anaylsis; EF 57%, revealing hypokinesis in apical walls. CT scan describes stable exam replacement of ascending aorta no dissection or intramural hematoma  Carotid Doppler. April 2018. Intimal thickening.

## 2024-01-17 NOTE — ASSESSMENT
[FreeTextEntry1] : DAVY PIRES is a 58 year old F who presents today Jan 16, 2024 with the above history and the following active issues:   Preoperative cardiovascular examination, breast revision surgery At present, there are no active cardiac conditions.  No recent unstable coronary syndromes, decompensated heart failure, severe valvular heart disease or significant dysrhythmias.   Baseline functional status is acceptable.     The clinical benefit of the proposed procedure outweighs the associated cardiovascular risk.   Risk not attenuated with further CV testing.   Prior testing as outlined above. Optimized from a cardiovascular perspective. DVT ppx Provided hx of TIA, DVT, PE and per hematology guidance will be holding AC preop, recommend continuation of ASA 81mg daily perioperatively.   ILR - no recent AF Follow-up device remotely Discussed removal of implantable loop recorder when at end of service.  Recurrent TIAs History of DVT/PE IVC filter antiphospholipid Prior DAYANA without PFO. AC as per hem  Aortic aneurysm s/p repair 7/15 Family history of aortic disease Surveillance monitoring - echocardiogram planned prior to next routine OV  Please do not hesitate to call for any questions or concerns.   Sincerely,  PATRICIA Payton Patients history, testing, and plan reviewed with supervising MD: Dr. Amarjit Easton

## 2024-01-19 ENCOUNTER — OUTPATIENT (OUTPATIENT)
Dept: OUTPATIENT SERVICES | Facility: HOSPITAL | Age: 59
LOS: 1 days | End: 2024-01-19
Payer: COMMERCIAL

## 2024-01-19 VITALS
RESPIRATION RATE: 16 BRPM | DIASTOLIC BLOOD PRESSURE: 90 MMHG | TEMPERATURE: 98 F | HEIGHT: 62 IN | WEIGHT: 203.05 LBS | HEART RATE: 58 BPM | SYSTOLIC BLOOD PRESSURE: 134 MMHG | OXYGEN SATURATION: 97 %

## 2024-01-19 DIAGNOSIS — C50.919 MALIGNANT NEOPLASM OF UNSPECIFIED SITE OF UNSPECIFIED FEMALE BREAST: ICD-10-CM

## 2024-01-19 DIAGNOSIS — Z98.890 OTHER SPECIFIED POSTPROCEDURAL STATES: Chronic | ICD-10-CM

## 2024-01-19 DIAGNOSIS — Z98.89 OTHER SPECIFIED POSTPROCEDURAL STATES: Chronic | ICD-10-CM

## 2024-01-19 DIAGNOSIS — Z01.818 ENCOUNTER FOR OTHER PREPROCEDURAL EXAMINATION: ICD-10-CM

## 2024-01-19 DIAGNOSIS — Z41.1 ENCOUNTER FOR COSMETIC SURGERY: Chronic | ICD-10-CM

## 2024-01-19 DIAGNOSIS — Z96.611 PRESENCE OF RIGHT ARTIFICIAL SHOULDER JOINT: Chronic | ICD-10-CM

## 2024-01-19 DIAGNOSIS — Z90.711 ACQUIRED ABSENCE OF UTERUS WITH REMAINING CERVICAL STUMP: Chronic | ICD-10-CM

## 2024-01-19 DIAGNOSIS — I26.99 OTHER PULMONARY EMBOLISM WITHOUT ACUTE COR PULMONALE: ICD-10-CM

## 2024-01-19 DIAGNOSIS — Q17.9 CONGENITAL MALFORMATION OF EAR, UNSPECIFIED: Chronic | ICD-10-CM

## 2024-01-19 DIAGNOSIS — G47.33 OBSTRUCTIVE SLEEP APNEA (ADULT) (PEDIATRIC): ICD-10-CM

## 2024-01-19 DIAGNOSIS — Z90.13 ACQUIRED ABSENCE OF BILATERAL BREASTS AND NIPPLES: Chronic | ICD-10-CM

## 2024-01-19 DIAGNOSIS — R59.1 GENERALIZED ENLARGED LYMPH NODES: Chronic | ICD-10-CM

## 2024-01-19 LAB
ANION GAP SERPL CALC-SCNC: 2 MMOL/L — LOW (ref 5–17)
APPEARANCE UR: CLEAR — SIGNIFICANT CHANGE UP
APTT BLD: 35.5 SEC — SIGNIFICANT CHANGE UP (ref 24.5–35.6)
BASOPHILS # BLD AUTO: 0.07 K/UL — SIGNIFICANT CHANGE UP (ref 0–0.2)
BASOPHILS NFR BLD AUTO: 0.9 % — SIGNIFICANT CHANGE UP (ref 0–2)
BILIRUB UR-MCNC: NEGATIVE — SIGNIFICANT CHANGE UP
BUN SERPL-MCNC: 15 MG/DL — SIGNIFICANT CHANGE UP (ref 7–23)
CALCIUM SERPL-MCNC: 9.9 MG/DL — SIGNIFICANT CHANGE UP (ref 8.5–10.1)
CHLORIDE SERPL-SCNC: 108 MMOL/L — SIGNIFICANT CHANGE UP (ref 96–108)
CO2 SERPL-SCNC: 29 MMOL/L — SIGNIFICANT CHANGE UP (ref 22–31)
COLOR SPEC: YELLOW — SIGNIFICANT CHANGE UP
CREAT SERPL-MCNC: 0.91 MG/DL — SIGNIFICANT CHANGE UP (ref 0.5–1.3)
DIFF PNL FLD: NEGATIVE — SIGNIFICANT CHANGE UP
EGFR: 73 ML/MIN/1.73M2 — SIGNIFICANT CHANGE UP
EOSINOPHIL # BLD AUTO: 0.14 K/UL — SIGNIFICANT CHANGE UP (ref 0–0.5)
EOSINOPHIL NFR BLD AUTO: 1.8 % — SIGNIFICANT CHANGE UP (ref 0–6)
GLUCOSE SERPL-MCNC: 94 MG/DL — SIGNIFICANT CHANGE UP (ref 70–99)
GLUCOSE UR QL: NEGATIVE MG/DL — SIGNIFICANT CHANGE UP
HCT VFR BLD CALC: 42.3 % — SIGNIFICANT CHANGE UP (ref 34.5–45)
HGB BLD-MCNC: 14.5 G/DL — SIGNIFICANT CHANGE UP (ref 11.5–15.5)
IMM GRANULOCYTES NFR BLD AUTO: 0.4 % — SIGNIFICANT CHANGE UP (ref 0–0.9)
INR BLD: 1.16 RATIO — SIGNIFICANT CHANGE UP (ref 0.85–1.18)
KETONES UR-MCNC: NEGATIVE MG/DL — SIGNIFICANT CHANGE UP
LEUKOCYTE ESTERASE UR-ACNC: ABNORMAL
LYMPHOCYTES # BLD AUTO: 1.8 K/UL — SIGNIFICANT CHANGE UP (ref 1–3.3)
LYMPHOCYTES # BLD AUTO: 23 % — SIGNIFICANT CHANGE UP (ref 13–44)
MCHC RBC-ENTMCNC: 31.4 PG — SIGNIFICANT CHANGE UP (ref 27–34)
MCHC RBC-ENTMCNC: 34.3 GM/DL — SIGNIFICANT CHANGE UP (ref 32–36)
MCV RBC AUTO: 91.6 FL — SIGNIFICANT CHANGE UP (ref 80–100)
MONOCYTES # BLD AUTO: 0.44 K/UL — SIGNIFICANT CHANGE UP (ref 0–0.9)
MONOCYTES NFR BLD AUTO: 5.6 % — SIGNIFICANT CHANGE UP (ref 2–14)
NEUTROPHILS # BLD AUTO: 5.35 K/UL — SIGNIFICANT CHANGE UP (ref 1.8–7.4)
NEUTROPHILS NFR BLD AUTO: 68.3 % — SIGNIFICANT CHANGE UP (ref 43–77)
NITRITE UR-MCNC: NEGATIVE — SIGNIFICANT CHANGE UP
PH UR: 7 — SIGNIFICANT CHANGE UP (ref 5–8)
PLATELET # BLD AUTO: 176 K/UL — SIGNIFICANT CHANGE UP (ref 150–400)
POTASSIUM SERPL-MCNC: 4.5 MMOL/L — SIGNIFICANT CHANGE UP (ref 3.5–5.3)
POTASSIUM SERPL-SCNC: 4.5 MMOL/L — SIGNIFICANT CHANGE UP (ref 3.5–5.3)
PROT UR-MCNC: NEGATIVE MG/DL — SIGNIFICANT CHANGE UP
PROTHROM AB SERPL-ACNC: 13 SEC — SIGNIFICANT CHANGE UP (ref 9.5–13)
RBC # BLD: 4.62 M/UL — SIGNIFICANT CHANGE UP (ref 3.8–5.2)
RBC # FLD: 13.5 % — SIGNIFICANT CHANGE UP (ref 10.3–14.5)
SODIUM SERPL-SCNC: 139 MMOL/L — SIGNIFICANT CHANGE UP (ref 135–145)
SP GR SPEC: 1.02 — SIGNIFICANT CHANGE UP (ref 1–1.03)
UROBILINOGEN FLD QL: 1 MG/DL — SIGNIFICANT CHANGE UP (ref 0.2–1)
WBC # BLD: 7.83 K/UL — SIGNIFICANT CHANGE UP (ref 3.8–10.5)
WBC # FLD AUTO: 7.83 K/UL — SIGNIFICANT CHANGE UP (ref 3.8–10.5)

## 2024-01-19 PROCEDURE — 36415 COLL VENOUS BLD VENIPUNCTURE: CPT

## 2024-01-19 PROCEDURE — 93010 ELECTROCARDIOGRAM REPORT: CPT

## 2024-01-19 PROCEDURE — 99214 OFFICE O/P EST MOD 30 MIN: CPT | Mod: 25

## 2024-01-19 PROCEDURE — 93005 ELECTROCARDIOGRAM TRACING: CPT

## 2024-01-19 PROCEDURE — 85610 PROTHROMBIN TIME: CPT

## 2024-01-19 PROCEDURE — 85730 THROMBOPLASTIN TIME PARTIAL: CPT

## 2024-01-19 PROCEDURE — 80048 BASIC METABOLIC PNL TOTAL CA: CPT

## 2024-01-19 PROCEDURE — 81001 URINALYSIS AUTO W/SCOPE: CPT

## 2024-01-19 PROCEDURE — 85025 COMPLETE CBC W/AUTO DIFF WBC: CPT

## 2024-01-19 RX ORDER — MULTIVIT-MIN/FERROUS GLUCONATE 9 MG/15 ML
1 LIQUID (ML) ORAL
Qty: 0 | Refills: 0 | DISCHARGE

## 2024-01-19 RX ORDER — FLUTICASONE PROPIONATE 50 MCG
2 SPRAY, SUSPENSION NASAL
Qty: 0 | Refills: 0 | DISCHARGE

## 2024-01-19 RX ORDER — PANTOPRAZOLE SODIUM 20 MG/1
1 TABLET, DELAYED RELEASE ORAL
Qty: 0 | Refills: 0 | DISCHARGE

## 2024-01-19 NOTE — H&P PST ADULT - ASSESSMENT
57 y/o female with hx PE (2017) on eliquis, Breat cancer s/p bilateral mastectomy 2012, HLD and GERD, anxiety presents to PST for scheduled bilateral breast revision reconstruction fat grafting on 1/26/24. Patient s/p multiple breast revision 2013 last done.

## 2024-01-19 NOTE — H&P PST ADULT - PROBLEM SELECTOR PLAN 1
Pre op and chlorhexidine instructions given and explained.  Avoid NSAIDs and OTC supplements.   Patient verbalized understanding  medical consult requested by surgeon  continue Lexapro and pantoprazole on the DOS

## 2024-01-19 NOTE — H&P PST ADULT - NSICDXFAMILYHX_GEN_ALL_CORE_FT
FAMILY HISTORY:  Sibling  Still living? Unknown  Family history of breast cancer, Age at diagnosis: Age Unknown    Aunt  Still living? Unknown  Family history of breast cancer, Age at diagnosis: Age Unknown

## 2024-01-19 NOTE — H&P PST ADULT - NSICDXPASTMEDICALHX_GEN_ALL_CORE_FT
PAST MEDICAL HISTORY:  Adjustment disorder with depressed mood     Aortic aneurysm diagnosed 2013    Bradycardia normally in the 40's    Breast CA     Contact dermatitis chronic hands    Decreased hearing, left uses hearing aid 80 % loss    Factor V Leiden carrier one copy of factor V deficiency carrier  on hematology follow up every 6 months    HTN (hypertension)     Incisional hernia     Morbid obesity on c-pap  gastric vertical sleeve done 07/14.  wt loss 100 lbs    MSSA (methicillin susceptible Staphylococcus aureus) infection 4/15 surgical inicsion , s/p hernia repair    FACUNDO (obstructive sleep apnea)     PE (pulmonary embolism) 09/2013  managed with Xarolta, now on asprin 81  IVC green field filter placed 07/2014    Pulmonary embolism 9/2013

## 2024-01-20 DIAGNOSIS — Z01.818 ENCOUNTER FOR OTHER PREPROCEDURAL EXAMINATION: ICD-10-CM

## 2024-01-26 ENCOUNTER — TRANSCRIPTION ENCOUNTER (OUTPATIENT)
Age: 59
End: 2024-01-26

## 2024-01-26 ENCOUNTER — OUTPATIENT (OUTPATIENT)
Dept: INPATIENT UNIT | Facility: HOSPITAL | Age: 59
LOS: 1 days | Discharge: HOME | End: 2024-01-26
Payer: COMMERCIAL

## 2024-01-26 VITALS
SYSTOLIC BLOOD PRESSURE: 133 MMHG | RESPIRATION RATE: 16 BRPM | OXYGEN SATURATION: 97 % | HEART RATE: 64 BPM | TEMPERATURE: 98 F | DIASTOLIC BLOOD PRESSURE: 83 MMHG

## 2024-01-26 VITALS
HEIGHT: 62.5 IN | SYSTOLIC BLOOD PRESSURE: 129 MMHG | HEART RATE: 50 BPM | RESPIRATION RATE: 16 BRPM | WEIGHT: 199.96 LBS | DIASTOLIC BLOOD PRESSURE: 78 MMHG | OXYGEN SATURATION: 97 % | TEMPERATURE: 98 F

## 2024-01-26 DIAGNOSIS — Z98.89 OTHER SPECIFIED POSTPROCEDURAL STATES: Chronic | ICD-10-CM

## 2024-01-26 DIAGNOSIS — Z98.890 OTHER SPECIFIED POSTPROCEDURAL STATES: Chronic | ICD-10-CM

## 2024-01-26 DIAGNOSIS — Z85.3 PERSONAL HISTORY OF MALIGNANT NEOPLASM OF BREAST: ICD-10-CM

## 2024-01-26 DIAGNOSIS — Z41.1 ENCOUNTER FOR COSMETIC SURGERY: Chronic | ICD-10-CM

## 2024-01-26 DIAGNOSIS — Z96.611 PRESENCE OF RIGHT ARTIFICIAL SHOULDER JOINT: Chronic | ICD-10-CM

## 2024-01-26 DIAGNOSIS — I10 ESSENTIAL (PRIMARY) HYPERTENSION: ICD-10-CM

## 2024-01-26 DIAGNOSIS — Z90.711 ACQUIRED ABSENCE OF UTERUS WITH REMAINING CERVICAL STUMP: Chronic | ICD-10-CM

## 2024-01-26 DIAGNOSIS — N65.1 DISPROPORTION OF RECONSTRUCTED BREAST: ICD-10-CM

## 2024-01-26 DIAGNOSIS — F41.9 ANXIETY DISORDER, UNSPECIFIED: ICD-10-CM

## 2024-01-26 DIAGNOSIS — Z90.13 ACQUIRED ABSENCE OF BILATERAL BREASTS AND NIPPLES: ICD-10-CM

## 2024-01-26 DIAGNOSIS — Z79.01 LONG TERM (CURRENT) USE OF ANTICOAGULANTS: ICD-10-CM

## 2024-01-26 DIAGNOSIS — Z86.711 PERSONAL HISTORY OF PULMONARY EMBOLISM: ICD-10-CM

## 2024-01-26 DIAGNOSIS — K21.9 GASTRO-ESOPHAGEAL REFLUX DISEASE WITHOUT ESOPHAGITIS: ICD-10-CM

## 2024-01-26 DIAGNOSIS — Q17.9 CONGENITAL MALFORMATION OF EAR, UNSPECIFIED: Chronic | ICD-10-CM

## 2024-01-26 DIAGNOSIS — R59.1 GENERALIZED ENLARGED LYMPH NODES: Chronic | ICD-10-CM

## 2024-01-26 DIAGNOSIS — Z87.891 PERSONAL HISTORY OF NICOTINE DEPENDENCE: ICD-10-CM

## 2024-01-26 DIAGNOSIS — L82.1 OTHER SEBORRHEIC KERATOSIS: ICD-10-CM

## 2024-01-26 DIAGNOSIS — Z90.13 ACQUIRED ABSENCE OF BILATERAL BREASTS AND NIPPLES: Chronic | ICD-10-CM

## 2024-01-26 DIAGNOSIS — D18.01 HEMANGIOMA OF SKIN AND SUBCUTANEOUS TISSUE: ICD-10-CM

## 2024-01-26 DIAGNOSIS — Z88.5 ALLERGY STATUS TO NARCOTIC AGENT: ICD-10-CM

## 2024-01-26 DIAGNOSIS — L91.0 HYPERTROPHIC SCAR: ICD-10-CM

## 2024-01-26 DIAGNOSIS — E78.5 HYPERLIPIDEMIA, UNSPECIFIED: ICD-10-CM

## 2024-01-26 DIAGNOSIS — Z79.82 LONG TERM (CURRENT) USE OF ASPIRIN: ICD-10-CM

## 2024-01-26 LAB — BLD GP AB SCN SERPL QL: SIGNIFICANT CHANGE UP

## 2024-01-26 PROCEDURE — 88305 TISSUE EXAM BY PATHOLOGIST: CPT

## 2024-01-26 PROCEDURE — C9290: CPT

## 2024-01-26 PROCEDURE — 86901 BLOOD TYPING SEROLOGIC RH(D): CPT

## 2024-01-26 PROCEDURE — 36415 COLL VENOUS BLD VENIPUNCTURE: CPT

## 2024-01-26 PROCEDURE — 86900 BLOOD TYPING SEROLOGIC ABO: CPT

## 2024-01-26 PROCEDURE — 88305 TISSUE EXAM BY PATHOLOGIST: CPT | Mod: 26

## 2024-01-26 PROCEDURE — 86850 RBC ANTIBODY SCREEN: CPT

## 2024-01-26 PROCEDURE — 88304 TISSUE EXAM BY PATHOLOGIST: CPT | Mod: 26

## 2024-01-26 PROCEDURE — 88304 TISSUE EXAM BY PATHOLOGIST: CPT

## 2024-01-26 RX ORDER — HYDROMORPHONE HYDROCHLORIDE 2 MG/ML
1 INJECTION INTRAMUSCULAR; INTRAVENOUS; SUBCUTANEOUS
Refills: 0 | Status: DISCONTINUED | OUTPATIENT
Start: 2024-01-26 | End: 2024-01-26

## 2024-01-26 RX ORDER — APIXABAN 2.5 MG/1
1 TABLET, FILM COATED ORAL
Refills: 0 | DISCHARGE

## 2024-01-26 RX ORDER — ROSUVASTATIN CALCIUM 5 MG/1
1 TABLET ORAL
Refills: 0 | DISCHARGE

## 2024-01-26 RX ORDER — ONDANSETRON 8 MG/1
4 TABLET, FILM COATED ORAL ONCE
Refills: 0 | Status: DISCONTINUED | OUTPATIENT
Start: 2024-01-26 | End: 2024-01-26

## 2024-01-26 RX ORDER — PANTOPRAZOLE SODIUM 20 MG/1
1 TABLET, DELAYED RELEASE ORAL
Refills: 0 | DISCHARGE

## 2024-01-26 RX ORDER — OXYCODONE HYDROCHLORIDE 5 MG/1
5 TABLET ORAL ONCE
Refills: 0 | Status: DISCONTINUED | OUTPATIENT
Start: 2024-01-26 | End: 2024-01-26

## 2024-01-26 RX ORDER — SODIUM CHLORIDE 9 MG/ML
1000 INJECTION, SOLUTION INTRAVENOUS
Refills: 0 | Status: DISCONTINUED | OUTPATIENT
Start: 2024-01-26 | End: 2024-01-26

## 2024-01-26 RX ADMIN — HYDROMORPHONE HYDROCHLORIDE 1 MILLIGRAM(S): 2 INJECTION INTRAMUSCULAR; INTRAVENOUS; SUBCUTANEOUS at 13:52

## 2024-01-26 RX ADMIN — OXYCODONE HYDROCHLORIDE 5 MILLIGRAM(S): 5 TABLET ORAL at 14:14

## 2024-01-26 RX ADMIN — HYDROMORPHONE HYDROCHLORIDE 1 MILLIGRAM(S): 2 INJECTION INTRAMUSCULAR; INTRAVENOUS; SUBCUTANEOUS at 14:12

## 2024-01-26 NOTE — ASU DISCHARGE PLAN (ADULT/PEDIATRIC) - NURSING INSTRUCTIONS
Refer to the multicolored fact sheet for any problems you experience. If you have difficulty urinating or unable to urinate in 8 hours after surgery, call your Dr., or return to  emergency room.  Notify your Dr. if your fever is 101 or greater. If the pain medicine your  recbarbarands does not help you, or you have severe pain call your Dr.. If you cannot reach the doctor, call [facility] Emergency Department at [phone] or go to your local Emergency Department. A responsible adult should be with you for the rest of the day and night for your safety, and to help you. Apply ice to affected area 20min on and 20min off for the  first 24-48 hours. Do not apply directly to skin, place barrier between ice and skin.  Resume your medications as listed on the attached Medication Record.

## 2024-01-26 NOTE — ASU PATIENT PROFILE, ADULT - NSICDXPASTSURGICALHX_GEN_ALL_CORE_FT
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    H/O ventral hernia repair 4/15    History of loop recorder     LN (lymphadenopathy) 2 AXILLARY ln REMOVED Right   -BENIGN 2007    Presence of IVC filter VIA RIGHT GROIN, 2014  with GASTRIC SLEEVE    S/P AAA (abdominal aortic aneurysm) repair 2015    S/p bilateral shoulder joint replacement     S/P biopsy 1991, right breast, 2007 axillary right arm pit (benign)    S/P biopsy 2011 left breast (negative)    S/P foot surgery 2013 rigth foot, fracture, hammer toe and bunionectomy    S/P mastectomy, bilateral LCIS,  DOUBLE MASTECTOMY   with DEIP procedure 2012 (right breast CA)    S/p partial hysterectomy with remaining cervical stump for fibroids 2008    S/P rhinoplasty 1982    S/P tonsillectomy 1979

## 2024-01-26 NOTE — ASU DISCHARGE PLAN (ADULT/PEDIATRIC) - ASU DC SPECIAL INSTRUCTIONSFT
May shower in 48 hours and reapply bra and dressings.   If drains present, record drain output every 12 hours and bring drain record to postoperative appointment.   Resume anticoagulation as per prescribing physician  May take Tylenol for mild to moderate pain, do not exceed 4,000mg of Tylenol in 24 hours  May take narcotic pain medication for severe pain.   Follow up in the office with Dr. Richardson

## 2024-01-26 NOTE — ASU DISCHARGE PLAN (ADULT/PEDIATRIC) - NS MD DC FALL RISK RISK
For information on Fall & Injury Prevention, visit: [email or web address] OR  [email or web address] OR  https://www.cdc.gov/steadi/patient.html

## 2024-01-26 NOTE — ASU PATIENT PROFILE, ADULT - FALL HARM RISK - UNIVERSAL INTERVENTIONS
Bed in lowest position, wheels locked, appropriate side rails in place/Call bell, personal items and telephone in reach/Instruct patient to call for assistance before getting out of bed or chair/Non-slip footwear when patient is out of bed/[place] to call system/Physically safe environment - no spills, clutter or unnecessary equipment/Purposeful Proactive Rounding/Room/bathroom lighting operational, light cord in reach

## 2024-01-26 NOTE — ASU DISCHARGE PLAN (ADULT/PEDIATRIC) - RETURN TO WORK/SCHOOL
No... Principal Discharge DX:	Humerus fracture  Goal:	discharge home with adequate pain management as well as assistance if patient needs  Assessment and plan of treatment:	discharge home

## 2024-02-07 LAB — SURGICAL PATHOLOGY STUDY: SIGNIFICANT CHANGE UP

## 2024-02-09 ENCOUNTER — NON-APPOINTMENT (OUTPATIENT)
Age: 59
End: 2024-02-09

## 2024-02-09 ENCOUNTER — APPOINTMENT (OUTPATIENT)
Dept: CARDIOLOGY | Facility: CLINIC | Age: 59
End: 2024-02-09
Payer: COMMERCIAL

## 2024-02-09 PROCEDURE — 93298 REM INTERROG DEV EVAL SCRMS: CPT

## 2024-03-14 ENCOUNTER — NON-APPOINTMENT (OUTPATIENT)
Age: 59
End: 2024-03-14

## 2024-03-15 ENCOUNTER — APPOINTMENT (OUTPATIENT)
Dept: CARDIOLOGY | Facility: CLINIC | Age: 59
End: 2024-03-15
Payer: COMMERCIAL

## 2024-03-15 PROCEDURE — 93298 REM INTERROG DEV EVAL SCRMS: CPT

## 2024-04-08 ENCOUNTER — OUTPATIENT (OUTPATIENT)
Dept: OUTPATIENT SERVICES | Facility: HOSPITAL | Age: 59
LOS: 1 days | End: 2024-04-08
Payer: COMMERCIAL

## 2024-04-08 DIAGNOSIS — Q17.9 CONGENITAL MALFORMATION OF EAR, UNSPECIFIED: Chronic | ICD-10-CM

## 2024-04-08 DIAGNOSIS — Z98.89 OTHER SPECIFIED POSTPROCEDURAL STATES: Chronic | ICD-10-CM

## 2024-04-08 DIAGNOSIS — Z98.890 OTHER SPECIFIED POSTPROCEDURAL STATES: Chronic | ICD-10-CM

## 2024-04-08 DIAGNOSIS — Z90.711 ACQUIRED ABSENCE OF UTERUS WITH REMAINING CERVICAL STUMP: Chronic | ICD-10-CM

## 2024-04-08 DIAGNOSIS — Z90.13 ACQUIRED ABSENCE OF BILATERAL BREASTS AND NIPPLES: Chronic | ICD-10-CM

## 2024-04-08 DIAGNOSIS — Z96.611 PRESENCE OF RIGHT ARTIFICIAL SHOULDER JOINT: Chronic | ICD-10-CM

## 2024-04-08 DIAGNOSIS — R59.1 GENERALIZED ENLARGED LYMPH NODES: Chronic | ICD-10-CM

## 2024-04-08 DIAGNOSIS — Z41.1 ENCOUNTER FOR COSMETIC SURGERY: Chronic | ICD-10-CM

## 2024-04-08 DIAGNOSIS — D50.9 IRON DEFICIENCY ANEMIA, UNSPECIFIED: ICD-10-CM

## 2024-04-11 DIAGNOSIS — D52.9 FOLATE DEFICIENCY ANEMIA, UNSPECIFIED: ICD-10-CM

## 2024-04-12 ENCOUNTER — APPOINTMENT (OUTPATIENT)
Dept: CARDIOLOGY | Facility: CLINIC | Age: 59
End: 2024-04-12
Payer: COMMERCIAL

## 2024-04-12 VITALS
OXYGEN SATURATION: 99 % | SYSTOLIC BLOOD PRESSURE: 122 MMHG | DIASTOLIC BLOOD PRESSURE: 80 MMHG | BODY MASS INDEX: 35.26 KG/M2 | HEART RATE: 55 BPM | WEIGHT: 199 LBS | HEIGHT: 63 IN

## 2024-04-12 DIAGNOSIS — I71.21 ANEURYSM OF THE ASCENDING AORTA, WITHOUT RUPTURE: ICD-10-CM

## 2024-04-12 DIAGNOSIS — Z86.79 OTHER SPECIFIED POSTPROCEDURAL STATES: ICD-10-CM

## 2024-04-12 DIAGNOSIS — Z98.890 OTHER SPECIFIED POSTPROCEDURAL STATES: ICD-10-CM

## 2024-04-12 DIAGNOSIS — D68.61 ANTIPHOSPHOLIPID SYNDROME: ICD-10-CM

## 2024-04-12 DIAGNOSIS — Z86.73 PERSONAL HISTORY OF TRANSIENT ISCHEMIC ATTACK (TIA), AND CEREBRAL INFARCTION W/OUT RESIDUAL DEFICITS: ICD-10-CM

## 2024-04-12 PROCEDURE — 93306 TTE W/DOPPLER COMPLETE: CPT

## 2024-04-12 PROCEDURE — 93978 VASCULAR STUDY: CPT

## 2024-04-12 PROCEDURE — 99214 OFFICE O/P EST MOD 30 MIN: CPT

## 2024-04-12 PROCEDURE — 93880 EXTRACRANIAL BILAT STUDY: CPT

## 2024-04-12 PROCEDURE — 93979 VASCULAR STUDY: CPT | Mod: 59

## 2024-04-15 ENCOUNTER — LABORATORY RESULT (OUTPATIENT)
Age: 59
End: 2024-04-15

## 2024-04-15 ENCOUNTER — RESULT REVIEW (OUTPATIENT)
Age: 59
End: 2024-04-15

## 2024-04-15 ENCOUNTER — APPOINTMENT (OUTPATIENT)
Dept: HEMATOLOGY ONCOLOGY | Facility: CLINIC | Age: 59
End: 2024-04-15
Payer: COMMERCIAL

## 2024-04-15 VITALS
SYSTOLIC BLOOD PRESSURE: 115 MMHG | DIASTOLIC BLOOD PRESSURE: 77 MMHG | HEIGHT: 63 IN | TEMPERATURE: 97.3 F | HEART RATE: 60 BPM | BODY MASS INDEX: 35.79 KG/M2 | WEIGHT: 202 LBS | OXYGEN SATURATION: 97 %

## 2024-04-15 DIAGNOSIS — D05.01 LOBULAR CARCINOMA IN SITU OF RIGHT BREAST: ICD-10-CM

## 2024-04-15 DIAGNOSIS — I26.99 OTHER PULMONARY EMBOLISM W/OUT ACUTE COR PULMONALE: ICD-10-CM

## 2024-04-15 DIAGNOSIS — M81.0 AGE-RELATED OSTEOPOROSIS W/OUT CURRENT PATHOLOGICAL FRACTURE: ICD-10-CM

## 2024-04-15 DIAGNOSIS — D05.02 LOBULAR CARCINOMA IN SITU OF RIGHT BREAST: ICD-10-CM

## 2024-04-15 LAB
BASOPHILS # BLD AUTO: 0.07 K/UL — SIGNIFICANT CHANGE UP (ref 0–0.2)
BASOPHILS NFR BLD AUTO: 0.8 % — SIGNIFICANT CHANGE UP (ref 0–2)
EOSINOPHIL # BLD AUTO: 0.13 K/UL — SIGNIFICANT CHANGE UP (ref 0–0.5)
EOSINOPHIL NFR BLD AUTO: 1.5 % — SIGNIFICANT CHANGE UP (ref 0–6)
HCT VFR BLD CALC: 41.9 % — SIGNIFICANT CHANGE UP (ref 34.5–45)
HGB BLD-MCNC: 14 G/DL — SIGNIFICANT CHANGE UP (ref 11.5–15.5)
IMM GRANULOCYTES NFR BLD AUTO: 0.2 % — SIGNIFICANT CHANGE UP (ref 0–0.9)
LYMPHOCYTES # BLD AUTO: 1.84 K/UL — SIGNIFICANT CHANGE UP (ref 1–3.3)
LYMPHOCYTES # BLD AUTO: 21.5 % — SIGNIFICANT CHANGE UP (ref 13–44)
MCHC RBC-ENTMCNC: 31.2 PG — SIGNIFICANT CHANGE UP (ref 27–34)
MCHC RBC-ENTMCNC: 33.4 GM/DL — SIGNIFICANT CHANGE UP (ref 32–36)
MCV RBC AUTO: 93.3 FL — SIGNIFICANT CHANGE UP (ref 80–100)
MONOCYTES # BLD AUTO: 0.5 K/UL — SIGNIFICANT CHANGE UP (ref 0–0.9)
MONOCYTES NFR BLD AUTO: 5.9 % — SIGNIFICANT CHANGE UP (ref 2–14)
NEUTROPHILS # BLD AUTO: 5.98 K/UL — SIGNIFICANT CHANGE UP (ref 1.8–7.4)
NEUTROPHILS NFR BLD AUTO: 70.1 % — SIGNIFICANT CHANGE UP (ref 43–77)
NRBC # BLD: 0 /100 WBCS — SIGNIFICANT CHANGE UP (ref 0–0)
PLATELET # BLD AUTO: 181 K/UL — SIGNIFICANT CHANGE UP (ref 150–400)
RBC # BLD: 4.49 M/UL — SIGNIFICANT CHANGE UP (ref 3.8–5.2)
RBC # FLD: 13.7 % — SIGNIFICANT CHANGE UP (ref 10.3–14.5)
WBC # BLD: 8.54 K/UL — SIGNIFICANT CHANGE UP (ref 3.8–10.5)
WBC # FLD AUTO: 8.54 K/UL — SIGNIFICANT CHANGE UP (ref 3.8–10.5)

## 2024-04-15 PROCEDURE — G2211 COMPLEX E/M VISIT ADD ON: CPT

## 2024-04-15 PROCEDURE — 99214 OFFICE O/P EST MOD 30 MIN: CPT

## 2024-04-15 NOTE — HISTORY OF PRESENT ILLNESS
[de-identified] : Referred by: Dr. Medhat Watkins  Previously followed by [facility] Dr. Oliva  Ms. Martin presented at age 56 in May 2022 for evaluation of APLS, history of PE.  The patient has a medical history of bilateral pleomorphic LCIS s/p bilateral mastectomy  (Dr. Karson Richardson, Dr. Mercedes Cuello), FVL heterozygosity, hereditary hemochromatosis heterozygosity, TIA x 3 (, , ), AAA s/p repair, DVT/PE s/p IVC filter, now on coumadin.   Presenting HPI: Estefania is here to establish care for history of pulmonary embolism ( s/p surgical procedure and travelling), and possible antiphospholipid syndrome.  She has previously been following with Dr. Oliva from [facility] for history of factor V Leiden heterozygosity, hereditary hemochromatosis (heterozygosity C282Y), history of TIA, on aspirin, history of DVT/PE.  Following her diagnosis of pulmonary embolism she was initiated on Xarelto which she was tolerating well.  She subsequently developed abdominal pain and was found to have a blood clot in the hepatic vein on imaging and her anticoagulation was switched to Coumadin given concern for failure that area of Xarelto as well as possible antiphospholipid syndrome given persistently elevated IgM anticardiolipin antibody.  In retrospect her imaging was reviewed by Dr. Caro and she did not have a blood clot in the hepatic vein.  Since she has been on Coumadin she has had difficulty maintaining a therapeutic INR.  She is currently taking 6 mg daily.  She states she is not feeling well and has become more anemic- required iron infusion recently. She also reports that she has been having episodes of hematuria and is being worked up for possible kidney stones.  She saw Dr. Red from urology and cystoscopy was performed which was normal.  She has had multiple urine cultures which were negative for infection. Her plavix has been discontinued. She also stopped her ASA last Thursday 2/2 hematuria which has resolved. Seen yesterday for INR check at [facility] - INR 1.8.   Of note, she has a history of bilateral LCIS, s/p bilateral mastectomy. Genetic testing was negative.   Laboratory studies from 5/3/22 reviewed and notable for: INR 1.66 D-dimer <150, iron studies sufficient, B12/folate sufficient, ferritin 557, WBC 6.29, Hb 12.8, Plt 187  SH:  - Occupation: works for [employer] as a   - Living situation: lives in [place] with her  - Smoking/etoh/illicits: former smoker, quit at age 25, rare etoh, denies illicits  - Exercise: not very physically active, previously played ice hockey   FH:  - Her brother  of pancreatic cancer at age 55 - Her mother  of breast cancer at age 62, diagnosed at age 55 - Her sister was diagnosed with DCIS at age 54 - Father and pAunt had hereditary hemochromatosis  - Father  of liver cancer at age 72  Eliquis 5mg BID - [facility] [phone] [de-identified] : Estefania is here for follow up on 4/15/24 for hypercoagulable state.   At today's visit she is feeling very well. She underwent breast reduction and revision of hypertrophic scar w/ Dr. Richardson at  on 1/26/24. She held eliquis for 48 hours prior to the procedure. She denied any major complications including bleeding or clotting. She currently remains on eliquis 5mg BID and ASA.  She reports a good energy level and appetite. She denies fevers, chills, chest pain, shortness of breath, nausea, vomiting, diarrhea, bleeding or bruising.  Reports a significant reaction after last zometa infusion (very bad bilateral knee pain, couldnt work), but willing to try one more infusion.   Health Care Maintenance: Updated 4/15/24 Mammogram: no longer screening, s/p bilateral mastectomy  Colonoscopy:  Colonoscopy with Dr. Sherman- reportedly unremarkable in 1/2022  Pap smear: Pap 2/2022, normal, hysterectomy removed in 2017 for fibroids, still has ovaries, fallopian tubes and cervix, DUE for gyn visit March 2024  Dentist: UTD PCP: Dr. Mila Morris ([facility]), UTD  Dermatology screen: UTD, Dr. Tavares  Genetic screen: BRCA negative  DEXA: DEXA 3/16/23- osteoporosis, had zometa infusion in October 2023, DUE  COVID vaccination: s/p 3 doses, also had a recent COVID infection in 9/2022  Flu vaccine: declines

## 2024-04-15 NOTE — END OF VISIT
[FreeTextEntry3] :  Patient care was discussed with NP and plan of care was formulated together.  I agree with the above.

## 2024-04-15 NOTE — DISCUSSION/SUMMARY
[FreeTextEntry1] : DAVY PIRES is a 58 year old F who presents with the above history and the following active issues:   ILR - no recent AF She is scheduled for removal of implantable loop recorder.   Recurrent TIAs History of DVT/PE IVC filter antiphospholipid Prior DAYANA without PFO. AC as per hem  Aortic aneurysm s/p repair 7/15 Family history of aortic disease Stable on echo done today.   F/U 6 months.

## 2024-04-15 NOTE — PHYSICAL EXAM
[Fully active, able to carry on all pre-disease performance without restriction] : Status 0 - Fully active, able to carry on all pre-disease performance without restriction [Normal] : affect appropriate [de-identified] : generally well appearing female, NAD  [de-identified] : s/p sternotomy, keloids present at surgical site  [de-identified] : s/p bilateral mastectomy for LCIS, well healed, s/p recent reduction and scar revision, no palpable masses or LAD bilaterally

## 2024-04-15 NOTE — RESULTS/DATA
[FreeTextEntry1] : Referred by: Dr. Medhat Watkins  Cardiology: Dr. Amarjit Easton  Previously followed by [facility] Dr. Oliva  Ms. Martin initially presented at age 56 in May 2022 for evaluation of APLS, history of PE.  The patient has a medical history of bilateral pleomorphic LCIS s/p bilateral mastectomy 2012 (Dr. Karson Richardson, Dr. Mercedes Cuello), FVL heterozygosity, hereditary hemochromatosis heterozygosity, TIA x 3 (2017, 2018, 2021), AAA s/p repair, DVT/PE s/p IVC filter, previously on coumadin.   She has multiple risk factors for recurrent clot and should remain on anticoagulation indefinitely.  She has not been adequately anticoagulated on coumadin- lab studies revealed low/mod risk APLS. Switched to eliquis for more stable anticoagulation at this time. Risks/benefits discussed with the patient in detail. Continue ASA.   PE/ Factor V leiden- continue eliquis 5 mg po BID, remains on ASA daily  Elevated ferritin- hemochromatosis heterozygous, follow ferritin.  DEXA 3/2023 revealed osteoporosis- received initial Zometa 9/27/23 w/ poor tolerance d/t body aches, swelling of knee Willing to proceed with additional infusion next week.   Hx of bilateral LCIS s/p bilateral mastectomy 2012.  S/p breast reduction and revision of hypertrophic scar w/ Dr. Richardson at  on 1/26/24.  Now healing well from this recent surgery.  All patient questions answered at today's visit. Patient urged to call the office with any questions or concerns.  I personally have spent a total of 35 minutes of time on the date of this encounter reviewing test results, documenting findings, coordinating care and directly consulting with the patient and/or designated family member. Greater than 50% of the face to face encounter time was spent on counseling and/or coordination of care for DVT/PE, factor V leiden, ?APLS, history of b/l LCIS.

## 2024-04-15 NOTE — HISTORY OF PRESENT ILLNESS
[FreeTextEntry1] : DAVY PIRES  is a 58 year old  F  with a history of breast ca, HTN, obesity s/p gastric sleeve, hiatal hernia repair June 2017, DVT/PE s/p IVC filter and NOAC, FACUNDO on CPAP, ascending aortic aneurysm repair using a #28 graft with Dr. Guerrero at [facility], HH, factor V carrier, recurrent TIAs, s/p ILR 9/20, antiphospholipid syndrome.   Presents today to review testing.  Tolerating rosuvastatin.  LDL: 32.  There is no prior history of coronary revascularization.  There is no history of symptomatic congestive heart failure or rheumatic heart disease.  There is no history of symptomatic arrhythmias including atrial fibrillation.  Prior DAYANA without PFO.  ILR no AF  Follows with hematology.  Antiphospholipid.  Intolerant to Coumadin.  Takes direct oral anticoagulant also on concomitant aspirin    She is playing [employer] ball. Denies exertional chest pain or discomfort.  Denies orthopnea, weight gain, or LE edema.  Denies palpitations, lightheadedness, dizziness, or syncope.   Denies any unusual bleeding or black/tarry stools.  She now requires a breast revision surgery.  Has seen hematology for preop guidance re AC.  There has been no recent illness or hospitalization.   4/12/2024.  Carotid ultrasound.  Mild nonobstructive disease on the right.  No evidence of disease on the left. 4/12/2024.  Abdominal ultrasound.  No atherosclerosis or aneurysm noted. 4/12/2024.  I 24.  Echocardiogram.  Technically difficult.  EF: 60%.  Eccentric hypertrophy.  Normal diastolic function.  Aneurysm repair at 3.6 cm.  Mild MR.  Trace TR and PI.  PASP: 26mmHg.    EKG 1/16/24 SB nonspecific ST-T wave abnormalities (TWI V2 V3 unchanged from prior)  Last blood work has a creatinine 0.7 total cholesterol 123 LDL 32 CPK 84  LP(a) within normal limits  stress test March 2023: 6 minutes no ischemia  Loop recorder unremarkable  Echocardiogram 3/2023 demonstrates normal left ventricular function aortic arch 3.7 cm mild mitral regurgitation.   Nuclear stress test 2/19/21: Lexiscan. No evidence of ischemia by EKG. There is a small, mild defect in apical wall that is fixed, suggestive of infarct. Clinical correlation suggested given hx of gastric sleeve and hiatal hernia. Post stress gated wall motion anaylsis; EF 57%, revealing hypokinesis in apical walls. CT scan describes stable exam replacement of ascending aorta no dissection or intramural hematoma  Carotid Doppler. April 2018. Intimal thickening.

## 2024-04-16 LAB
FERRITIN SERPL-MCNC: 150 NG/ML
FOLATE SERPL-MCNC: >20 NG/ML
IRON SATN MFR SERPL: 21 %
IRON SERPL-MCNC: 63 UG/DL
TIBC SERPL-MCNC: 304 UG/DL
UIBC SERPL-MCNC: 241 UG/DL
VIT B12 SERPL-MCNC: 514 PG/ML

## 2024-04-19 ENCOUNTER — APPOINTMENT (OUTPATIENT)
Dept: CARDIOLOGY | Facility: CLINIC | Age: 59
End: 2024-04-19

## 2024-04-26 ENCOUNTER — APPOINTMENT (OUTPATIENT)
Dept: INFUSION THERAPY | Facility: CANCER CENTER | Age: 59
End: 2024-04-26

## 2024-04-26 PROCEDURE — 85027 COMPLETE CBC AUTOMATED: CPT

## 2024-04-26 PROCEDURE — 96365 THER/PROPH/DIAG IV INF INIT: CPT

## 2024-04-29 ENCOUNTER — RESULT REVIEW (OUTPATIENT)
Age: 59
End: 2024-04-29

## 2024-05-03 ENCOUNTER — APPOINTMENT (OUTPATIENT)
Dept: CARDIOLOGY | Facility: CLINIC | Age: 59
End: 2024-05-03
Payer: COMMERCIAL

## 2024-05-03 DIAGNOSIS — G45.9 TRANSIENT CEREBRAL ISCHEMIC ATTACK, UNSPECIFIED: ICD-10-CM

## 2024-05-03 PROCEDURE — 99024 POSTOP FOLLOW-UP VISIT: CPT

## 2024-05-12 PROBLEM — G45.9 TRANSIENT CEREBRAL ISCHEMIC ATTACK: Status: ACTIVE | Noted: 2020-09-15

## 2024-06-06 ENCOUNTER — APPOINTMENT (OUTPATIENT)
Dept: CARDIOLOGY | Facility: CLINIC | Age: 59
End: 2024-06-06

## 2024-06-13 RX ORDER — APIXABAN 5 MG/1
5 TABLET, FILM COATED ORAL
Qty: 180 | Refills: 1 | Status: ACTIVE | COMMUNITY
Start: 2022-11-21 | End: 1900-01-01

## 2024-07-30 ENCOUNTER — APPOINTMENT (OUTPATIENT)
Dept: MRI IMAGING | Facility: CLINIC | Age: 59
End: 2024-07-30
Payer: COMMERCIAL

## 2024-07-30 PROCEDURE — 73721 MRI JNT OF LWR EXTRE W/O DYE: CPT | Mod: LT

## 2024-08-12 NOTE — H&P PST ADULT - NSICDXPASTSURGICALHX_GEN_ALL_CORE_FT
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    H/O ventral hernia repair 4/15    History of loop recorder     LN (lymphadenopathy) 2 AXILLARY ln REMOVED Right   -BENIGN 2007    Presence of IVC filter VIA RIGHT GROIN, 2014  with GASTRIC SLEEVE    S/P AAA (abdominal aortic aneurysm) repair 2015    S/p bilateral shoulder joint replacement     S/P biopsy 1991, right breast, 2007 axillary right arm pit (benign)    S/P biopsy 2011 left breast (negative)    S/P foot surgery 2013 rigth foot, fracture, hammer toe and bunionectomy    S/P mastectomy, bilateral LCIS,  DOUBLE MASTECTOMY   with DEIP procedure 2012 (right breast CA)    S/p partial hysterectomy with remaining cervical stump for fibroids 2008    S/P rhinoplasty 1982    S/P tonsillectomy 1979

## 2024-08-17 NOTE — H&P PST ADULT - NSALCOHOLPROBLEMSRELYN_GEN_A_CORE_SD
"Pt presents to the ED today for evaluation of wound to left leg after getting bit by an unknown animal x2 days ago. Pt reports he did not witness what bit him but endorses a bite to his left, anterior distal thigh. Pt reports he has been picking at it by "popping it and cutting it open." Patient denies fever, sob, chest pain but endorses erythema to anterior thigh and warmth to touch.   " no

## 2024-08-22 ENCOUNTER — APPOINTMENT (OUTPATIENT)
Dept: MRI IMAGING | Facility: CLINIC | Age: 59
End: 2024-08-22

## 2024-08-22 PROCEDURE — 70540 MRI ORBIT/FACE/NECK W/O DYE: CPT

## 2024-09-02 ENCOUNTER — NON-APPOINTMENT (OUTPATIENT)
Age: 59
End: 2024-09-02

## 2024-09-26 ENCOUNTER — APPOINTMENT (OUTPATIENT)
Dept: CARDIOLOGY | Facility: CLINIC | Age: 59
End: 2024-09-26
Payer: COMMERCIAL

## 2024-09-26 ENCOUNTER — NON-APPOINTMENT (OUTPATIENT)
Age: 59
End: 2024-09-26

## 2024-09-26 VITALS
OXYGEN SATURATION: 96 % | HEART RATE: 50 BPM | DIASTOLIC BLOOD PRESSURE: 80 MMHG | HEIGHT: 63 IN | WEIGHT: 194 LBS | BODY MASS INDEX: 34.38 KG/M2 | SYSTOLIC BLOOD PRESSURE: 120 MMHG

## 2024-09-26 DIAGNOSIS — R94.31 ABNORMAL ELECTROCARDIOGRAM [ECG] [EKG]: ICD-10-CM

## 2024-09-26 DIAGNOSIS — Z01.810 ENCOUNTER FOR PREPROCEDURAL CARDIOVASCULAR EXAMINATION: ICD-10-CM

## 2024-09-26 DIAGNOSIS — E78.2 MIXED HYPERLIPIDEMIA: ICD-10-CM

## 2024-09-26 PROCEDURE — 99214 OFFICE O/P EST MOD 30 MIN: CPT

## 2024-09-26 PROCEDURE — 93000 ELECTROCARDIOGRAM COMPLETE: CPT

## 2024-09-26 RX ORDER — GLUCOSAMINE/MSM/CHONDROIT SULF 500-166.6
TABLET ORAL
Refills: 0 | Status: ACTIVE | COMMUNITY

## 2024-09-26 NOTE — DISCUSSION/SUMMARY
[FreeTextEntry1] : DAVY PIRES is a 58 year old F who presents with the above history and the following active issues:   Recurrent TIAs History of DVT/PE IVC filter antiphospholipid Prior DAYANA without PFO. AC as per hem  Aortic aneurysm s/p repair 7/15 Family history of aortic disease Stable on echo.  At present, there are no active cardiac conditions.  No recent unstable coronary syndromes, decompensated heart failure, severe valvular heart disease or significant dysrhythmias.  Baseline functional status is acceptable.  The clinical benefit of the proposed procedure outweighs the associated cardiovascular risk.  Risk not attenuated with further CV testing.  Prior testing as outlined above. Optimized from a cardiovascular perspective. Eliquis management as per Heme.   Would suggest remaining on ASA throughout the procedure.  If necessary, may hold 5-7 prior and resume ASAP thereafter.    F/U as scheduled.  [EKG obtained to assist in diagnosis and management of assessed problem(s)] : EKG obtained to assist in diagnosis and management of assessed problem(s)

## 2024-09-26 NOTE — HISTORY OF PRESENT ILLNESS
[FreeTextEntry1] : DAVY PIRES  is a 58 year old  F with a history of breast ca, HTN, obesity s/p gastric sleeve, hiatal hernia repair June 2017, DVT/PE s/p IVC filter and NOAC, FACUNDO on CPAP, ascending aortic aneurysm repair using a #28 graft with Dr. Guerrero at [facility], HH, factor V carrier, recurrent TIAs, s/p ILR 9/20, antiphospholipid syndrome.   Presents today requesting preop clearance prior to [place] T[place] with [place]. Bowling [place] [facility] [place].  EKG today with sinus bradycardia.  Hx of same.   Has seen hematology for preop guidance re AC.  There has been no recent illness or hospitalization.  Tolerating rosuvastatin.  LDL: 32.  There is no prior history of coronary revascularization.  There is no history of symptomatic congestive heart failure or rheumatic heart disease.  There is no history of symptomatic arrhythmias including atrial fibrillation.  Prior DAYANA without PFO.  ILR no AF  Follows with hematology.  Antiphospholipid.  Intolerant to Coumadin.  Takes direct oral anticoagulant also on concomitant aspirin    She is active, but restricted by knee pain.   Denies exertional chest pain or discomfort.  Denies orthopnea, weight gain, or LE edema.  Denies palpitations, lightheadedness, dizziness, or syncope.   Denies any unusual bleeding or black/tarry stools.  . 4/12/2024.  Carotid ultrasound.  Mild nonobstructive disease on the right.  No evidence of disease on the left. 4/12/2024.  Abdominal ultrasound.  No atherosclerosis or aneurysm noted. 4/12/2024.  I 24.  Echocardiogram.  Technically difficult.  EF: 60%.  Eccentric hypertrophy.  Normal diastolic function.  Aneurysm repair at 3.6 cm.  Mild MR.  Trace TR and PI.  PASP: 26mmHg.    EKG 1/16/24 SB nonspecific ST-T wave abnormalities (TWI V2 V3 unchanged from prior)  Last blood work has a creatinine 0.7 total cholesterol 123 LDL 32 CPK 84  LP(a) within normal limits  stress test March 2023: 6 minutes no ischemia  Loop recorder unremarkable  Echocardiogram 3/2023 demonstrates normal left ventricular function aortic arch 3.7 cm mild mitral regurgitation.   Nuclear stress test 2/19/21: Lexiscan. No evidence of ischemia by EKG. There is a small, mild defect in apical wall that is fixed, suggestive of infarct. Clinical correlation suggested given hx of gastric sleeve and hiatal hernia. Post stress gated wall motion anaylsis; EF 57%, revealing hypokinesis in apical walls. CT scan describes stable exam replacement of ascending aorta no dissection or intramural hematoma  Carotid Doppler. April 2018. Intimal thickening.

## 2024-09-26 NOTE — HISTORY OF PRESENT ILLNESS
[FreeTextEntry1] : DAVY PIRES  is a 58 year old  F with a history of breast ca, HTN, obesity s/p gastric sleeve, hiatal hernia repair June 2017, DVT/PE s/p IVC filter and NOAC, FACUNDO on CPAP, ascending aortic aneurysm repair using a #28 graft with Dr. Guerrero at [facility], HH, factor V carrier, recurrent TIAs, s/p ILR 9/20, antiphospholipid syndrome.   Presents today requesting preop clearance prior to [place] with [place]. Bowling [place] [facility] [place].  EKG today with sinus bradycardia.  Hx of same.   Has seen hematology for preop guidance re AC.  There has been no recent illness or hospitalization.  Tolerating rosuvastatin.  LDL: 32.  There is no prior history of coronary revascularization.  There is no history of symptomatic congestive heart failure or rheumatic heart disease.  There is no history of symptomatic arrhythmias including atrial fibrillation.  Prior DAYANA without PFO.  ILR no AF  Follows with hematology.  Antiphospholipid.  Intolerant to Coumadin.  Takes direct oral anticoagulant also on concomitant aspirin    She is active, but restricted by knee pain.   Denies exertional chest pain or discomfort.  Denies orthopnea, weight gain, or LE edema.  Denies palpitations, lightheadedness, dizziness, or syncope.   Denies any unusual bleeding or black/tarry stools.  . 4/12/2024.  Carotid ultrasound.  Mild nonobstructive disease on the right.  No evidence of disease on the left. 4/12/2024.  Abdominal ultrasound.  No atherosclerosis or aneurysm noted. 4/12/2024.  I 24.  Echocardiogram.  Technically difficult.  EF: 60%.  Eccentric hypertrophy.  Normal diastolic function.  Aneurysm repair at 3.6 cm.  Mild MR.  Trace TR and PI.  PASP: 26mmHg.    EKG 1/16/24 SB nonspecific ST-T wave abnormalities (TWI V2 V3 unchanged from prior)  Last blood work has a creatinine 0.7 total cholesterol 123 LDL 32 CPK 84  LP(a) within normal limits  stress test March 2023: 6 minutes no ischemia  Loop recorder unremarkable  Echocardiogram 3/2023 demonstrates normal left ventricular function aortic arch 3.7 cm mild mitral regurgitation.   Nuclear stress test 2/19/21: Lexiscan. No evidence of ischemia by EKG. There is a small, mild defect in apical wall that is fixed, suggestive of infarct. Clinical correlation suggested given hx of gastric sleeve and hiatal hernia. Post stress gated wall motion anaylsis; EF 57%, revealing hypokinesis in apical walls. CT scan describes stable exam replacement of ascending aorta no dissection or intramural hematoma  Carotid Doppler. April 2018. Intimal thickening.

## 2024-10-03 ENCOUNTER — OUTPATIENT (OUTPATIENT)
Dept: OUTPATIENT SERVICES | Facility: HOSPITAL | Age: 59
LOS: 1 days | End: 2024-10-03

## 2024-10-03 DIAGNOSIS — Z98.89 OTHER SPECIFIED POSTPROCEDURAL STATES: Chronic | ICD-10-CM

## 2024-10-03 DIAGNOSIS — Z90.711 ACQUIRED ABSENCE OF UTERUS WITH REMAINING CERVICAL STUMP: Chronic | ICD-10-CM

## 2024-10-03 DIAGNOSIS — Z98.890 OTHER SPECIFIED POSTPROCEDURAL STATES: Chronic | ICD-10-CM

## 2024-10-03 DIAGNOSIS — D05.0 LOBULAR CARCINOMA IN SITU OF BREAST: ICD-10-CM

## 2024-10-03 DIAGNOSIS — Z90.13 ACQUIRED ABSENCE OF BILATERAL BREASTS AND NIPPLES: Chronic | ICD-10-CM

## 2024-10-03 DIAGNOSIS — Z96.611 PRESENCE OF RIGHT ARTIFICIAL SHOULDER JOINT: Chronic | ICD-10-CM

## 2024-10-03 DIAGNOSIS — Q17.9 CONGENITAL MALFORMATION OF EAR, UNSPECIFIED: Chronic | ICD-10-CM

## 2024-10-03 DIAGNOSIS — Z41.1 ENCOUNTER FOR COSMETIC SURGERY: Chronic | ICD-10-CM

## 2024-10-03 DIAGNOSIS — R59.1 GENERALIZED ENLARGED LYMPH NODES: Chronic | ICD-10-CM

## 2024-10-07 DIAGNOSIS — D52.9 FOLATE DEFICIENCY ANEMIA, UNSPECIFIED: ICD-10-CM

## 2024-10-07 DIAGNOSIS — R79.89 OTHER SPECIFIED ABNORMAL FINDINGS OF BLOOD CHEMISTRY: ICD-10-CM

## 2024-10-09 ENCOUNTER — RESULT REVIEW (OUTPATIENT)
Age: 59
End: 2024-10-09

## 2024-10-09 ENCOUNTER — APPOINTMENT (OUTPATIENT)
Dept: HEMATOLOGY ONCOLOGY | Facility: CLINIC | Age: 59
End: 2024-10-09
Payer: COMMERCIAL

## 2024-10-09 ENCOUNTER — NON-APPOINTMENT (OUTPATIENT)
Age: 59
End: 2024-10-09

## 2024-10-09 VITALS
OXYGEN SATURATION: 98 % | TEMPERATURE: 98.6 F | HEIGHT: 63 IN | DIASTOLIC BLOOD PRESSURE: 85 MMHG | WEIGHT: 196 LBS | BODY MASS INDEX: 34.73 KG/M2 | SYSTOLIC BLOOD PRESSURE: 119 MMHG | HEART RATE: 58 BPM

## 2024-10-09 DIAGNOSIS — I26.99 OTHER PULMONARY EMBOLISM W/OUT ACUTE COR PULMONALE: ICD-10-CM

## 2024-10-09 DIAGNOSIS — D05.01 LOBULAR CARCINOMA IN SITU OF RIGHT BREAST: ICD-10-CM

## 2024-10-09 DIAGNOSIS — M81.0 AGE-RELATED OSTEOPOROSIS W/OUT CURRENT PATHOLOGICAL FRACTURE: ICD-10-CM

## 2024-10-09 DIAGNOSIS — D05.02 LOBULAR CARCINOMA IN SITU OF RIGHT BREAST: ICD-10-CM

## 2024-10-09 DIAGNOSIS — D68.61 ANTIPHOSPHOLIPID SYNDROME: ICD-10-CM

## 2024-10-09 LAB
BASOPHILS # BLD AUTO: 0.05 K/UL — SIGNIFICANT CHANGE UP (ref 0–0.2)
BASOPHILS NFR BLD AUTO: 0.8 % — SIGNIFICANT CHANGE UP (ref 0–2)
EOSINOPHIL # BLD AUTO: 0.13 K/UL — SIGNIFICANT CHANGE UP (ref 0–0.5)
EOSINOPHIL NFR BLD AUTO: 2.2 % — SIGNIFICANT CHANGE UP (ref 0–6)
HCT VFR BLD CALC: 41.3 % — SIGNIFICANT CHANGE UP (ref 34.5–45)
HGB BLD-MCNC: 13.7 G/DL — SIGNIFICANT CHANGE UP (ref 11.5–15.5)
IMM GRANULOCYTES NFR BLD AUTO: 0.3 % — SIGNIFICANT CHANGE UP (ref 0–0.9)
LYMPHOCYTES # BLD AUTO: 1.63 K/UL — SIGNIFICANT CHANGE UP (ref 1–3.3)
LYMPHOCYTES # BLD AUTO: 27.3 % — SIGNIFICANT CHANGE UP (ref 13–44)
MCHC RBC-ENTMCNC: 31.5 PG — SIGNIFICANT CHANGE UP (ref 27–34)
MCHC RBC-ENTMCNC: 33.2 GM/DL — SIGNIFICANT CHANGE UP (ref 32–36)
MCV RBC AUTO: 94.9 FL — SIGNIFICANT CHANGE UP (ref 80–100)
MONOCYTES # BLD AUTO: 0.35 K/UL — SIGNIFICANT CHANGE UP (ref 0–0.9)
MONOCYTES NFR BLD AUTO: 5.9 % — SIGNIFICANT CHANGE UP (ref 2–14)
NEUTROPHILS # BLD AUTO: 3.8 K/UL — SIGNIFICANT CHANGE UP (ref 1.8–7.4)
NEUTROPHILS NFR BLD AUTO: 63.5 % — SIGNIFICANT CHANGE UP (ref 43–77)
NRBC # BLD: 0 /100 WBCS — SIGNIFICANT CHANGE UP (ref 0–0)
PLATELET # BLD AUTO: 171 K/UL — SIGNIFICANT CHANGE UP (ref 150–400)
RBC # BLD: 4.35 M/UL — SIGNIFICANT CHANGE UP (ref 3.8–5.2)
RBC # FLD: 13.8 % — SIGNIFICANT CHANGE UP (ref 10.3–14.5)
WBC # BLD: 5.98 K/UL — SIGNIFICANT CHANGE UP (ref 3.8–10.5)
WBC # FLD AUTO: 5.98 K/UL — SIGNIFICANT CHANGE UP (ref 3.8–10.5)

## 2024-10-09 PROCEDURE — 85027 COMPLETE CBC AUTOMATED: CPT

## 2024-10-09 PROCEDURE — 99214 OFFICE O/P EST MOD 30 MIN: CPT

## 2024-10-10 LAB
25(OH)D3 SERPL-MCNC: 32.1 NG/ML
ALBUMIN SERPL ELPH-MCNC: 4.4 G/DL
ALP BLD-CCNC: 67 U/L
ALT SERPL-CCNC: 12 U/L
ANION GAP SERPL CALC-SCNC: 12 MMOL/L
AST SERPL-CCNC: 18 U/L
BILIRUB SERPL-MCNC: 0.7 MG/DL
BUN SERPL-MCNC: 11 MG/DL
CALCIUM SERPL-MCNC: 9.6 MG/DL
CHLORIDE SERPL-SCNC: 108 MMOL/L
CO2 SERPL-SCNC: 24 MMOL/L
CREAT SERPL-MCNC: 0.74 MG/DL
EGFR: 94 ML/MIN/1.73M2
FERRITIN SERPL-MCNC: 186 NG/ML
FOLATE SERPL-MCNC: >20 NG/ML
GLUCOSE SERPL-MCNC: 88 MG/DL
IRON SATN MFR SERPL: 24 %
IRON SERPL-MCNC: 73 UG/DL
POTASSIUM SERPL-SCNC: 4.1 MMOL/L
PROT SERPL-MCNC: 6.8 G/DL
SODIUM SERPL-SCNC: 144 MMOL/L
TIBC SERPL-MCNC: 301 UG/DL
UIBC SERPL-MCNC: 228 UG/DL
VIT B12 SERPL-MCNC: 480 PG/ML

## 2024-10-10 RX ORDER — ENOXAPARIN SODIUM 100 MG/ML
100 INJECTION, SOLUTION SUBCUTANEOUS
Qty: 5 | Refills: 0 | Status: ACTIVE | COMMUNITY
Start: 2024-10-09 | End: 1900-01-01

## 2024-10-18 ENCOUNTER — APPOINTMENT (OUTPATIENT)
Dept: HEMATOLOGY ONCOLOGY | Facility: CLINIC | Age: 59
End: 2024-10-18

## 2024-10-25 ENCOUNTER — APPOINTMENT (OUTPATIENT)
Dept: INFUSION THERAPY | Facility: CANCER CENTER | Age: 59
End: 2024-10-25

## 2024-11-05 ENCOUNTER — APPOINTMENT (OUTPATIENT)
Dept: CARDIOLOGY | Facility: CLINIC | Age: 59
End: 2024-11-05
Payer: COMMERCIAL

## 2024-11-05 VITALS
BODY MASS INDEX: 35.79 KG/M2 | DIASTOLIC BLOOD PRESSURE: 66 MMHG | OXYGEN SATURATION: 97 % | SYSTOLIC BLOOD PRESSURE: 112 MMHG | WEIGHT: 202 LBS | HEIGHT: 63 IN | HEART RATE: 67 BPM

## 2024-11-05 DIAGNOSIS — I71.21 ANEURYSM OF THE ASCENDING AORTA, WITHOUT RUPTURE: ICD-10-CM

## 2024-11-05 DIAGNOSIS — Z95.818 PRESENCE OF OTHER CARDIAC IMPLANTS AND GRAFTS: ICD-10-CM

## 2024-11-05 DIAGNOSIS — E78.2 MIXED HYPERLIPIDEMIA: ICD-10-CM

## 2024-11-05 DIAGNOSIS — R01.1 CARDIAC MURMUR, UNSPECIFIED: ICD-10-CM

## 2024-11-05 DIAGNOSIS — Z98.890 OTHER SPECIFIED POSTPROCEDURAL STATES: ICD-10-CM

## 2024-11-05 DIAGNOSIS — Z86.79 OTHER SPECIFIED POSTPROCEDURAL STATES: ICD-10-CM

## 2024-11-05 PROCEDURE — 99214 OFFICE O/P EST MOD 30 MIN: CPT

## 2024-11-05 RX ORDER — LORATADINE 10 MG
17 TABLET,DISINTEGRATING ORAL
Refills: 0 | Status: ACTIVE | COMMUNITY

## 2024-11-05 RX ORDER — MELOXICAM 10 MG/1
CAPSULE ORAL
Refills: 0 | Status: ACTIVE | COMMUNITY

## 2024-11-05 RX ORDER — TRAMADOL HYDROCHLORIDE 50 MG/1
50 TABLET, COATED ORAL
Refills: 0 | Status: ACTIVE | COMMUNITY

## 2024-11-11 ENCOUNTER — APPOINTMENT (OUTPATIENT)
Dept: ULTRASOUND IMAGING | Facility: CLINIC | Age: 59
End: 2024-11-11
Payer: COMMERCIAL

## 2024-11-11 PROCEDURE — 93971 EXTREMITY STUDY: CPT | Mod: LT

## 2025-03-31 ENCOUNTER — OUTPATIENT (OUTPATIENT)
Dept: OUTPATIENT SERVICES | Facility: HOSPITAL | Age: 60
LOS: 1 days | End: 2025-03-31
Payer: COMMERCIAL

## 2025-03-31 DIAGNOSIS — Z90.13 ACQUIRED ABSENCE OF BILATERAL BREASTS AND NIPPLES: Chronic | ICD-10-CM

## 2025-03-31 DIAGNOSIS — Z98.89 OTHER SPECIFIED POSTPROCEDURAL STATES: Chronic | ICD-10-CM

## 2025-03-31 DIAGNOSIS — Z98.890 OTHER SPECIFIED POSTPROCEDURAL STATES: Chronic | ICD-10-CM

## 2025-03-31 DIAGNOSIS — R59.1 GENERALIZED ENLARGED LYMPH NODES: Chronic | ICD-10-CM

## 2025-03-31 DIAGNOSIS — D05.01 LOBULAR CARCINOMA IN SITU OF RIGHT BREAST: ICD-10-CM

## 2025-03-31 DIAGNOSIS — Z90.711 ACQUIRED ABSENCE OF UTERUS WITH REMAINING CERVICAL STUMP: Chronic | ICD-10-CM

## 2025-03-31 DIAGNOSIS — I26.99 OTHER PULMONARY EMBOLISM WITHOUT ACUTE COR PULMONALE: ICD-10-CM

## 2025-03-31 DIAGNOSIS — Z96.611 PRESENCE OF RIGHT ARTIFICIAL SHOULDER JOINT: Chronic | ICD-10-CM

## 2025-03-31 DIAGNOSIS — Q17.9 CONGENITAL MALFORMATION OF EAR, UNSPECIFIED: Chronic | ICD-10-CM

## 2025-03-31 DIAGNOSIS — Z41.1 ENCOUNTER FOR COSMETIC SURGERY: Chronic | ICD-10-CM

## 2025-04-03 DIAGNOSIS — D52.9 FOLATE DEFICIENCY ANEMIA, UNSPECIFIED: ICD-10-CM

## 2025-04-07 ENCOUNTER — APPOINTMENT (OUTPATIENT)
Dept: HEMATOLOGY ONCOLOGY | Facility: CLINIC | Age: 60
End: 2025-04-07
Payer: COMMERCIAL

## 2025-04-07 ENCOUNTER — RESULT REVIEW (OUTPATIENT)
Age: 60
End: 2025-04-07

## 2025-04-07 VITALS
DIASTOLIC BLOOD PRESSURE: 88 MMHG | TEMPERATURE: 97.6 F | HEART RATE: 62 BPM | OXYGEN SATURATION: 95 % | WEIGHT: 180 LBS | SYSTOLIC BLOOD PRESSURE: 129 MMHG | BODY MASS INDEX: 31.89 KG/M2

## 2025-04-07 DIAGNOSIS — D68.61 ANTIPHOSPHOLIPID SYNDROME: ICD-10-CM

## 2025-04-07 DIAGNOSIS — D05.02 LOBULAR CARCINOMA IN SITU OF RIGHT BREAST: ICD-10-CM

## 2025-04-07 DIAGNOSIS — D05.01 LOBULAR CARCINOMA IN SITU OF RIGHT BREAST: ICD-10-CM

## 2025-04-07 DIAGNOSIS — I26.99 OTHER PULMONARY EMBOLISM W/OUT ACUTE COR PULMONALE: ICD-10-CM

## 2025-04-07 DIAGNOSIS — M81.0 AGE-RELATED OSTEOPOROSIS W/OUT CURRENT PATHOLOGICAL FRACTURE: ICD-10-CM

## 2025-04-07 LAB
BASOPHILS # BLD AUTO: 0.07 K/UL — SIGNIFICANT CHANGE UP (ref 0–0.2)
BASOPHILS NFR BLD AUTO: 1.1 % — SIGNIFICANT CHANGE UP (ref 0–2)
EOSINOPHIL # BLD AUTO: 0.15 K/UL — SIGNIFICANT CHANGE UP (ref 0–0.5)
EOSINOPHIL NFR BLD AUTO: 2.3 % — SIGNIFICANT CHANGE UP (ref 0–6)
HCT VFR BLD CALC: 41.1 % — SIGNIFICANT CHANGE UP (ref 34.5–45)
HGB BLD-MCNC: 13.6 G/DL — SIGNIFICANT CHANGE UP (ref 11.5–15.5)
IMM GRANULOCYTES NFR BLD AUTO: 0.3 % — SIGNIFICANT CHANGE UP (ref 0–0.9)
LYMPHOCYTES # BLD AUTO: 1.63 K/UL — SIGNIFICANT CHANGE UP (ref 1–3.3)
LYMPHOCYTES # BLD AUTO: 25.2 % — SIGNIFICANT CHANGE UP (ref 13–44)
MCHC RBC-ENTMCNC: 30.4 PG — SIGNIFICANT CHANGE UP (ref 27–34)
MCHC RBC-ENTMCNC: 33.1 G/DL — SIGNIFICANT CHANGE UP (ref 32–36)
MCV RBC AUTO: 91.9 FL — SIGNIFICANT CHANGE UP (ref 80–100)
MONOCYTES # BLD AUTO: 0.55 K/UL — SIGNIFICANT CHANGE UP (ref 0–0.9)
MONOCYTES NFR BLD AUTO: 8.5 % — SIGNIFICANT CHANGE UP (ref 2–14)
NEUTROPHILS # BLD AUTO: 4.06 K/UL — SIGNIFICANT CHANGE UP (ref 1.8–7.4)
NEUTROPHILS NFR BLD AUTO: 62.6 % — SIGNIFICANT CHANGE UP (ref 43–77)
NRBC BLD AUTO-RTO: 0 /100 WBCS — SIGNIFICANT CHANGE UP (ref 0–0)
PLATELET # BLD AUTO: 208 K/UL — SIGNIFICANT CHANGE UP (ref 150–400)
RBC # BLD: 4.47 M/UL — SIGNIFICANT CHANGE UP (ref 3.8–5.2)
RBC # FLD: 14.7 % — HIGH (ref 10.3–14.5)
WBC # BLD: 6.48 K/UL — SIGNIFICANT CHANGE UP (ref 3.8–10.5)
WBC # FLD AUTO: 6.48 K/UL — SIGNIFICANT CHANGE UP (ref 3.8–10.5)

## 2025-04-07 PROCEDURE — 99214 OFFICE O/P EST MOD 30 MIN: CPT

## 2025-04-07 PROCEDURE — G2211 COMPLEX E/M VISIT ADD ON: CPT | Mod: NC

## 2025-04-07 PROCEDURE — 85027 COMPLETE CBC AUTOMATED: CPT

## 2025-04-08 LAB
ALBUMIN SERPL ELPH-MCNC: 4.5 G/DL
ALP BLD-CCNC: 73 U/L
ALT SERPL-CCNC: 13 U/L
ANION GAP SERPL CALC-SCNC: 13 MMOL/L
AST SERPL-CCNC: 21 U/L
BILIRUB SERPL-MCNC: 0.6 MG/DL
BUN SERPL-MCNC: 16 MG/DL
CALCIUM SERPL-MCNC: 9.5 MG/DL
CHLORIDE SERPL-SCNC: 108 MMOL/L
CO2 SERPL-SCNC: 22 MMOL/L
CREAT SERPL-MCNC: 0.79 MG/DL
EGFRCR SERPLBLD CKD-EPI 2021: 86 ML/MIN/1.73M2
FERRITIN SERPL-MCNC: 155 NG/ML
FOLATE SERPL-MCNC: >20 NG/ML
GLUCOSE SERPL-MCNC: 83 MG/DL
IRON SATN MFR SERPL: 28 %
IRON SERPL-MCNC: 79 UG/DL
POTASSIUM SERPL-SCNC: 4.5 MMOL/L
PROT SERPL-MCNC: 6.7 G/DL
SODIUM SERPL-SCNC: 143 MMOL/L
TIBC SERPL-MCNC: 288 UG/DL
UIBC SERPL-MCNC: 209 UG/DL
VIT B12 SERPL-MCNC: 406 PG/ML

## 2025-04-14 NOTE — HISTORY OF PRESENT ILLNESS
[FreeTextEntry1] : DAVY PIRES  is a 54 year old  F\par History of breast ca, HTN, obesity s/p gastric sleeve, hiatal hernia repair June 2017 (Dr. Jose Douglas), DVT/PE s/p IVC filter and NOAC, FACUNDO on CPAP and underwent an ascending aortic aneurysm resection and repair using a #28 graft with Dr. Guerrero at [facility], HH, factor V carrier, TIA\par  \par Resolution of atypical chest pain\par There is no significant dyspnea on exertion. \par There is no lower extremity edema. \par There are no symptomatic palpitations, lightheadedness, dizziness, or syncope. \par \par There is no prior history of coronary revascularization. \par There is no history of symptomatic congestive heart failure or rheumatic heart disease. \par There is no history of symptomatic arrhythmias including atrial fibrillation.\par \par There is significant osteoarthritis. The arthritis is impacting her quality of life. \par s/p shoulder replacement \par Now plan for contralateral procedure\par Previously treated with Lovenox and Xarelto perioperatively \par \par Carotid Doppler. April 2018. Intimal thickening. \par \par Stress echocardiogram April 2018. Exercised to stage III of Dirk protocol. Normal hemodynamic response to exercise. Normal pulmonary pressures response to exercise. No ischemia.\par \par Blood work October 2019 potassium 3.8 creatinine 0.7 LDL 99 total cholesterol 195 \par Echocardiogram December 2019 has normal left ventricular function no aortic regurgitation no pulmonary hypertension\par EKG NSR NSST
0 (no pain/absence of nonverbal indicators of pain)

## 2025-04-16 ENCOUNTER — NON-APPOINTMENT (OUTPATIENT)
Age: 60
End: 2025-04-16

## 2025-04-18 ENCOUNTER — NON-APPOINTMENT (OUTPATIENT)
Age: 60
End: 2025-04-18

## 2025-04-18 ENCOUNTER — APPOINTMENT (OUTPATIENT)
Dept: CARDIOLOGY | Facility: CLINIC | Age: 60
End: 2025-04-18
Payer: COMMERCIAL

## 2025-04-18 VITALS
SYSTOLIC BLOOD PRESSURE: 100 MMHG | DIASTOLIC BLOOD PRESSURE: 60 MMHG | OXYGEN SATURATION: 97 % | BODY MASS INDEX: 31.89 KG/M2 | HEART RATE: 59 BPM | WEIGHT: 180 LBS

## 2025-04-18 DIAGNOSIS — R94.31 ABNORMAL ELECTROCARDIOGRAM [ECG] [EKG]: ICD-10-CM

## 2025-04-18 DIAGNOSIS — G47.33 OBSTRUCTIVE SLEEP APNEA (ADULT) (PEDIATRIC): ICD-10-CM

## 2025-04-18 DIAGNOSIS — R00.1 BRADYCARDIA, UNSPECIFIED: ICD-10-CM

## 2025-04-18 DIAGNOSIS — I71.21 ANEURYSM OF THE ASCENDING AORTA, WITHOUT RUPTURE: ICD-10-CM

## 2025-04-18 DIAGNOSIS — Z86.79 OTHER SPECIFIED POSTPROCEDURAL STATES: ICD-10-CM

## 2025-04-18 DIAGNOSIS — E78.2 MIXED HYPERLIPIDEMIA: ICD-10-CM

## 2025-04-18 DIAGNOSIS — Z98.890 OTHER SPECIFIED POSTPROCEDURAL STATES: ICD-10-CM

## 2025-04-18 DIAGNOSIS — I34.0 NONRHEUMATIC MITRAL (VALVE) INSUFFICIENCY: ICD-10-CM

## 2025-04-18 PROCEDURE — 93242 EXT ECG>48HR<7D RECORDING: CPT

## 2025-04-18 PROCEDURE — 93000 ELECTROCARDIOGRAM COMPLETE: CPT

## 2025-04-18 PROCEDURE — 99214 OFFICE O/P EST MOD 30 MIN: CPT

## 2025-05-05 PROCEDURE — 93248 EXT ECG>7D<15D REV&INTERPJ: CPT

## 2025-05-09 ENCOUNTER — APPOINTMENT (OUTPATIENT)
Dept: RADIOLOGY | Facility: CLINIC | Age: 60
End: 2025-05-09
Payer: COMMERCIAL

## 2025-05-09 PROCEDURE — 77080 DXA BONE DENSITY AXIAL: CPT

## 2025-05-13 ENCOUNTER — NON-APPOINTMENT (OUTPATIENT)
Age: 60
End: 2025-05-13

## 2025-05-20 ENCOUNTER — APPOINTMENT (OUTPATIENT)
Dept: CARDIOLOGY | Facility: CLINIC | Age: 60
End: 2025-05-20
Payer: COMMERCIAL

## 2025-05-20 VITALS
DIASTOLIC BLOOD PRESSURE: 68 MMHG | WEIGHT: 179 LBS | OXYGEN SATURATION: 98 % | SYSTOLIC BLOOD PRESSURE: 114 MMHG | BODY MASS INDEX: 31.71 KG/M2 | HEART RATE: 53 BPM

## 2025-05-20 DIAGNOSIS — R94.31 ABNORMAL ELECTROCARDIOGRAM [ECG] [EKG]: ICD-10-CM

## 2025-05-20 DIAGNOSIS — I34.0 NONRHEUMATIC MITRAL (VALVE) INSUFFICIENCY: ICD-10-CM

## 2025-05-20 DIAGNOSIS — E78.2 MIXED HYPERLIPIDEMIA: ICD-10-CM

## 2025-05-20 PROCEDURE — 99214 OFFICE O/P EST MOD 30 MIN: CPT

## 2025-05-27 ENCOUNTER — APPOINTMENT (OUTPATIENT)
Dept: CARDIOLOGY | Facility: CLINIC | Age: 60
End: 2025-05-27
Payer: COMMERCIAL

## 2025-05-27 PROCEDURE — 93306 TTE W/DOPPLER COMPLETE: CPT

## 2025-05-27 PROCEDURE — 96374 THER/PROPH/DIAG INJ IV PUSH: CPT | Mod: 59

## 2025-05-30 ENCOUNTER — APPOINTMENT (OUTPATIENT)
Dept: CARDIOLOGY | Facility: CLINIC | Age: 60
End: 2025-05-30
Payer: COMMERCIAL

## 2025-05-30 VITALS
SYSTOLIC BLOOD PRESSURE: 110 MMHG | HEART RATE: 59 BPM | OXYGEN SATURATION: 95 % | HEIGHT: 63 IN | BODY MASS INDEX: 31.01 KG/M2 | DIASTOLIC BLOOD PRESSURE: 70 MMHG | WEIGHT: 175 LBS

## 2025-05-30 DIAGNOSIS — R00.1 BRADYCARDIA, UNSPECIFIED: ICD-10-CM

## 2025-05-30 DIAGNOSIS — I71.21 ANEURYSM OF THE ASCENDING AORTA, WITHOUT RUPTURE: ICD-10-CM

## 2025-05-30 DIAGNOSIS — Z98.890 OTHER SPECIFIED POSTPROCEDURAL STATES: ICD-10-CM

## 2025-05-30 DIAGNOSIS — Z86.79 OTHER SPECIFIED POSTPROCEDURAL STATES: ICD-10-CM

## 2025-05-30 PROCEDURE — 99214 OFFICE O/P EST MOD 30 MIN: CPT

## 2025-06-03 ENCOUNTER — APPOINTMENT (OUTPATIENT)
Dept: CT IMAGING | Facility: CLINIC | Age: 60
End: 2025-06-03
Payer: COMMERCIAL

## 2025-06-03 PROCEDURE — 71275 CT ANGIOGRAPHY CHEST: CPT

## 2025-06-19 ENCOUNTER — APPOINTMENT (OUTPATIENT)
Dept: CARDIOTHORACIC SURGERY | Facility: CLINIC | Age: 60
End: 2025-06-19
Payer: COMMERCIAL

## 2025-06-19 VITALS
RESPIRATION RATE: 16 BRPM | HEART RATE: 56 BPM | OXYGEN SATURATION: 98 % | WEIGHT: 177 LBS | DIASTOLIC BLOOD PRESSURE: 77 MMHG | HEIGHT: 63 IN | TEMPERATURE: 98.8 F | BODY MASS INDEX: 31.36 KG/M2 | SYSTOLIC BLOOD PRESSURE: 121 MMHG

## 2025-06-19 PROCEDURE — 99204 OFFICE O/P NEW MOD 45 MIN: CPT

## 2025-06-24 ENCOUNTER — TRANSCRIPTION ENCOUNTER (OUTPATIENT)
Age: 60
End: 2025-06-24

## 2025-06-27 ENCOUNTER — APPOINTMENT (OUTPATIENT)
Dept: ELECTROPHYSIOLOGY | Facility: CLINIC | Age: 60
End: 2025-06-27
Payer: COMMERCIAL

## 2025-06-27 PROCEDURE — 99214 OFFICE O/P EST MOD 30 MIN: CPT | Mod: 25

## 2025-06-27 PROCEDURE — 93000 ELECTROCARDIOGRAM COMPLETE: CPT

## 2025-06-30 ENCOUNTER — RX RENEWAL (OUTPATIENT)
Age: 60
End: 2025-06-30

## 2025-07-08 ENCOUNTER — APPOINTMENT (OUTPATIENT)
Dept: CARDIOLOGY | Facility: CLINIC | Age: 60
End: 2025-07-08
Payer: COMMERCIAL

## 2025-07-08 VITALS
WEIGHT: 173 LBS | OXYGEN SATURATION: 98 % | HEART RATE: 52 BPM | SYSTOLIC BLOOD PRESSURE: 110 MMHG | DIASTOLIC BLOOD PRESSURE: 68 MMHG | BODY MASS INDEX: 30.65 KG/M2 | HEIGHT: 63 IN

## 2025-07-08 PROCEDURE — 99024 POSTOP FOLLOW-UP VISIT: CPT

## 2025-07-08 RX ORDER — ONDANSETRON HYDROCHLORIDE 8 MG/1
8 TABLET, FILM COATED ORAL DAILY
Refills: 0 | Status: ACTIVE | COMMUNITY

## 2025-07-10 ENCOUNTER — NON-APPOINTMENT (OUTPATIENT)
Age: 60
End: 2025-07-10

## 2025-07-29 ENCOUNTER — APPOINTMENT (OUTPATIENT)
Dept: CARDIOLOGY | Facility: CLINIC | Age: 60
End: 2025-07-29
Payer: COMMERCIAL

## 2025-07-29 VITALS
HEIGHT: 63 IN | BODY MASS INDEX: 30.65 KG/M2 | WEIGHT: 173 LBS | DIASTOLIC BLOOD PRESSURE: 64 MMHG | HEART RATE: 55 BPM | OXYGEN SATURATION: 96 % | SYSTOLIC BLOOD PRESSURE: 112 MMHG

## 2025-07-29 DIAGNOSIS — Z86.79 OTHER SPECIFIED POSTPROCEDURAL STATES: ICD-10-CM

## 2025-07-29 DIAGNOSIS — I71.21 ANEURYSM OF THE ASCENDING AORTA, WITHOUT RUPTURE: ICD-10-CM

## 2025-07-29 DIAGNOSIS — Z98.890 OTHER SPECIFIED POSTPROCEDURAL STATES: ICD-10-CM

## 2025-07-29 DIAGNOSIS — E78.2 MIXED HYPERLIPIDEMIA: ICD-10-CM

## 2025-07-29 DIAGNOSIS — D68.61 ANTIPHOSPHOLIPID SYNDROME: ICD-10-CM

## 2025-07-29 PROCEDURE — 99214 OFFICE O/P EST MOD 30 MIN: CPT

## 2025-08-11 ENCOUNTER — APPOINTMENT (OUTPATIENT)
Dept: ELECTROPHYSIOLOGY | Facility: CLINIC | Age: 60
End: 2025-08-11
Payer: COMMERCIAL

## 2025-08-11 VITALS — SYSTOLIC BLOOD PRESSURE: 150 MMHG | OXYGEN SATURATION: 96 % | HEART RATE: 70 BPM | DIASTOLIC BLOOD PRESSURE: 100 MMHG

## 2025-08-11 DIAGNOSIS — R00.1 BRADYCARDIA, UNSPECIFIED: ICD-10-CM

## 2025-08-11 PROCEDURE — 99215 OFFICE O/P EST HI 40 MIN: CPT

## 2025-08-11 PROCEDURE — 93000 ELECTROCARDIOGRAM COMPLETE: CPT | Mod: 59

## 2025-08-11 PROCEDURE — 93285 PRGRMG DEV EVAL SCRMS IP: CPT

## 2025-09-15 ENCOUNTER — NON-APPOINTMENT (OUTPATIENT)
Age: 60
End: 2025-09-15

## 2025-09-15 ENCOUNTER — APPOINTMENT (OUTPATIENT)
Dept: CARDIOLOGY | Facility: CLINIC | Age: 60
End: 2025-09-15